# Patient Record
Sex: FEMALE | Race: WHITE | Employment: UNEMPLOYED | URBAN - NONMETROPOLITAN AREA
[De-identification: names, ages, dates, MRNs, and addresses within clinical notes are randomized per-mention and may not be internally consistent; named-entity substitution may affect disease eponyms.]

---

## 2019-05-30 ENCOUNTER — HOSPITAL ENCOUNTER (EMERGENCY)
Age: 39
Discharge: HOME | End: 2019-05-31
Attending: EMERGENCY MEDICINE

## 2019-05-30 VITALS
HEART RATE: 91 BPM | SYSTOLIC BLOOD PRESSURE: 113 MMHG | TEMPERATURE: 97.7 F | OXYGEN SATURATION: 98 % | WEIGHT: 293 LBS | RESPIRATION RATE: 18 BRPM | BODY MASS INDEX: 45.99 KG/M2 | HEIGHT: 67 IN | DIASTOLIC BLOOD PRESSURE: 74 MMHG

## 2019-05-30 DIAGNOSIS — L02.91 ABSCESS: Primary | ICD-10-CM

## 2019-05-30 PROCEDURE — 99283 EMERGENCY DEPT VISIT LOW MDM: CPT

## 2019-05-30 PROCEDURE — 4500000023 HC ED LEVEL 3 PROCEDURE

## 2019-05-30 RX ORDER — SULFAMETHOXAZOLE AND TRIMETHOPRIM 800; 160 MG/1; MG/1
1 TABLET ORAL 2 TIMES DAILY
COMMUNITY
End: 2021-05-14

## 2019-05-30 RX ORDER — ALBUTEROL SULFATE 90 UG/1
2 AEROSOL, METERED RESPIRATORY (INHALATION) EVERY 6 HOURS PRN
COMMUNITY

## 2019-05-30 RX ORDER — OXYCODONE HYDROCHLORIDE AND ACETAMINOPHEN 5; 325 MG/1; MG/1
1 TABLET ORAL ONCE
Status: COMPLETED | OUTPATIENT
Start: 2019-05-31 | End: 2019-05-31

## 2019-05-30 ASSESSMENT — PAIN DESCRIPTION - LOCATION: LOCATION: ARM

## 2019-05-30 ASSESSMENT — PAIN DESCRIPTION - FREQUENCY: FREQUENCY: CONTINUOUS

## 2019-05-30 ASSESSMENT — PAIN DESCRIPTION - ORIENTATION: ORIENTATION: LEFT

## 2019-05-30 ASSESSMENT — PAIN DESCRIPTION - PROGRESSION: CLINICAL_PROGRESSION: GRADUALLY WORSENING

## 2019-05-30 ASSESSMENT — PAIN DESCRIPTION - DESCRIPTORS: DESCRIPTORS: BURNING;TENDER

## 2019-05-30 ASSESSMENT — PAIN DESCRIPTION - PAIN TYPE: TYPE: ACUTE PAIN

## 2019-05-30 ASSESSMENT — PAIN SCALES - GENERAL: PAINLEVEL_OUTOF10: 7

## 2019-05-31 PROCEDURE — 6370000000 HC RX 637 (ALT 250 FOR IP): Performed by: EMERGENCY MEDICINE

## 2019-05-31 RX ORDER — OXYCODONE HYDROCHLORIDE AND ACETAMINOPHEN 5; 325 MG/1; MG/1
1 TABLET ORAL EVERY 6 HOURS PRN
Qty: 20 TABLET | Refills: 0 | Status: SHIPPED | OUTPATIENT
Start: 2019-05-31 | End: 2019-06-03

## 2019-05-31 RX ORDER — CLINDAMYCIN HYDROCHLORIDE 300 MG/1
300 CAPSULE ORAL 4 TIMES DAILY
Qty: 40 CAPSULE | Refills: 0 | Status: SHIPPED | OUTPATIENT
Start: 2019-05-31 | End: 2019-06-10

## 2019-05-31 RX ADMIN — OXYCODONE AND ACETAMINOPHEN 1 TABLET: 5; 325 TABLET ORAL at 00:03

## 2019-05-31 NOTE — ED PROVIDER NOTES
Emergency Department Attending Note    Fox Nava MD    Date of ED VIsit: 5/30/2019    CHIEF COMPLAINT  Abscess (under left arm pit. seen by pcp a week ago and placed on medications. per pt increased pain and swelling)      HISTORY OF PRESENT ILLNESS  Jimi Begum is a 34 y.o. female  With Vital signs of /74   Pulse 91   Temp 97.7 °F (36.5 °C) (Oral)   Resp 18   Ht 5' 7\" (1.702 m)   Wt (!) 316 lb (143.3 kg)   LMP 05/09/2019   SpO2 98%   BMI 49.49 kg/m²  who presents to the ED with a complaint of left axillary abscess. Patient seen and evaluated in room 6. Patient was seen by some physician that does put her on antibiotics and did not pursue I incision and drainage on this patient and told her to come to the emergency department if she needed to be incised or drained and if it got worse. And that now the case the patient states that's gotten worse despite being on antibiotics. She states it was been no drainage from the area. The wound or abscess itself is a rather large bouts signs of a golf ball under her left arm near the lateral aspects of her left breast.  It is deep to the surface of the skin. Despite that I will make an attempt to at least relieve some of the tension from that as well. She was numbed and incised in an attempt drained a small amount of pus was extracted from it but otherwise is still is large collection of 5 pus in that region. That I think she needs to be seen by a surgeon to have this evacuated. .  No other complaints, modifying factors or associated symptoms. I have reviewed the following from the nursing documentation. Past Medical History:   Diagnosis Date    Obesity     Scoliosis      History reviewed. No pertinent surgical history. History reviewed. No pertinent family history.   Social History     Socioeconomic History    Marital status:      Spouse name: Not on file    Number of children: Not on file    Years of education: Not on file    Highest education level: Not on file   Occupational History    Not on file   Social Needs    Financial resource strain: Not on file    Food insecurity:     Worry: Not on file     Inability: Not on file    Transportation needs:     Medical: Not on file     Non-medical: Not on file   Tobacco Use    Smoking status: Current Every Day Smoker     Packs/day: 1.00     Types: Cigarettes    Smokeless tobacco: Never Used   Substance and Sexual Activity    Alcohol use: Yes     Alcohol/week: 4.8 oz     Types: 8 Cans of beer per week    Drug use: Yes     Types: Marijuana    Sexual activity: Not on file   Lifestyle    Physical activity:     Days per week: Not on file     Minutes per session: Not on file    Stress: Not on file   Relationships    Social connections:     Talks on phone: Not on file     Gets together: Not on file     Attends Hinduism service: Not on file     Active member of club or organization: Not on file     Attends meetings of clubs or organizations: Not on file     Relationship status: Not on file    Intimate partner violence:     Fear of current or ex partner: Not on file     Emotionally abused: Not on file     Physically abused: Not on file     Forced sexual activity: Not on file   Other Topics Concern    Not on file   Social History Narrative    Not on file     No current facility-administered medications for this encounter.       Current Outpatient Medications   Medication Sig Dispense Refill    sulfamethoxazole-trimethoprim (BACTRIM DS;SEPTRA DS) 800-160 MG per tablet Take 1 tablet by mouth 2 times daily      albuterol sulfate  (90 Base) MCG/ACT inhaler Inhale 2 puffs into the lungs every 6 hours as needed for Wheezing      beclomethasone (QVAR) 80 MCG/ACT inhaler Inhale 1 puff into the lungs 2 times daily      HYDROcodone-acetaminophen (NORCO) 5-325 MG per tablet Take 1 tablet by mouth every 6 hours as needed 12 tablet 0     No Known Allergies    REVIEW OF SYSTEMS  10 systems reviewed, pertinent positives per HPI otherwise noted to be negative     PHYSICAL EXAM  /74   Pulse 91   Temp 97.7 °F (36.5 °C) (Oral)   Resp 18   Ht 5' 7\" (1.702 m)   Wt (!) 316 lb (143.3 kg)   LMP 05/09/2019   SpO2 98%   BMI 49.49 kg/m²   GENERAL APPEARANCE: Awake and alert. Cooperative. In mild distress. HEAD: Normocephalic. Atraumatic. EYES: PERRL. EOM's grossly intact. ENT: Mucous membranes are pink and moist.   NECK: Supple. HEART: RRR. No murmurs. LUNGS: Respirations unlabored. CTAB. Good air exchange. ABDOMEN: Soft. Non-distended. Non-tender. No masses. No organomegaly. No guarding or rebound. EXTREMITIES: No peripheral edema. Moves all extremities equally. All extremities neurovascularly intact. There is a large golf wall-sized abscess under the left arm that is too deep for me to incise and drain despite having tried. I did get a small amount of pus and I did pack the region. SKIN: Warm and dry. No acute rashes. NEUROLOGICAL: Alert and oriented. . Strength 5/5, sensation intact. Gait normal.   PSYCHIATRIC: Normal mood and affect. No HI or SI expressed to me. PROCEDURE:  INCISION & DRAINAGE  Sonu Tsai or their surrogate had an opportunity to ask questions, and the risks, benefits, and alternatives were discussed. The abscess was prepped and draped to maintain a sterile field. A local anesthetic was used to anesthetize the abscess. An incision was made to keep the abscess open so it will continue to drain. It was copiously irrigated with its loculations broken down. There were no complications during the procedure. ED COURSE/MDM             Unfortunately I was not able to release the major pocket of pus so on the referring her on to a surgeon for worse surgical drainage of this abscess. I told her to start with her primary care physician and work from there as far as getting this taken care of.   She'll be given an antibiotic (clindamycin) done and pain medication to get her through until she can get that taken care of. Old records were reviewed when applicable.  The ED course and plan were reviewed and results discussed with the patient    CLINICAL IMPRESSION and DISPOSITION  Santos Dominique was stable and diagnosed with axillary abscess       Maria Elena Hardin MD  05/31/19 0030

## 2019-06-03 ENCOUNTER — HOSPITAL ENCOUNTER (EMERGENCY)
Age: 39
Discharge: HOME | End: 2019-06-03

## 2019-06-03 VITALS
BODY MASS INDEX: 44.41 KG/M2 | RESPIRATION RATE: 16 BRPM | OXYGEN SATURATION: 99 % | WEIGHT: 293 LBS | HEIGHT: 68 IN | HEART RATE: 90 BPM | SYSTOLIC BLOOD PRESSURE: 125 MMHG | TEMPERATURE: 97.1 F | DIASTOLIC BLOOD PRESSURE: 86 MMHG

## 2019-06-03 DIAGNOSIS — L02.419 AXILLARY ABSCESS: Primary | ICD-10-CM

## 2019-06-03 PROCEDURE — 99283 EMERGENCY DEPT VISIT LOW MDM: CPT

## 2019-06-03 PROCEDURE — 4500000023 HC ED LEVEL 3 PROCEDURE

## 2019-06-03 RX ORDER — ONDANSETRON 4 MG/1
4 TABLET, FILM COATED ORAL EVERY 8 HOURS PRN
Qty: 20 TABLET | Refills: 0 | Status: SHIPPED | OUTPATIENT
Start: 2019-06-03 | End: 2021-05-14

## 2019-06-03 RX ORDER — CHLORHEXIDINE GLUCONATE 4 G/100ML
SOLUTION TOPICAL
Qty: 1 BOTTLE | Refills: 2 | Status: SHIPPED | OUTPATIENT
Start: 2019-06-03 | End: 2019-06-17

## 2019-06-03 ASSESSMENT — PAIN SCALES - GENERAL: PAINLEVEL_OUTOF10: 7

## 2019-06-03 ASSESSMENT — PATIENT HEALTH QUESTIONNAIRE - PHQ9: SUM OF ALL RESPONSES TO PHQ QUESTIONS 1-9: 10

## 2019-06-03 ASSESSMENT — PAIN DESCRIPTION - PAIN TYPE: TYPE: ACUTE PAIN

## 2019-06-03 NOTE — ED PROVIDER NOTES
Emergency [facility] ED    Patient: Santos Dominique  EFN:[id number]  : 1980  Date of Evaluation: 6/3/2019  ED [facility] Provider: DELFIN Finn    Chief Complaint       Chief Complaint   Patient presents with    Abscess     pt c/o abscess under left armpit. Pt went to [place] [address] thursday and didnt cut it in the right spot. Pt still has abscess in the upper armpit. Pt currently on antibiotics for it     Antonette Jessica is a 34 y.o. female with no past medical history significant for recurrent cutaneous abscesses now presents again for abscess at the left axillary area. Patient was recently evaluated at [place] [address]. oral given underwent incision and drainage however this was incomplete and now presents for recurrence of fluctuance at the left axilla consistent with abscess at that site she describes ongoing 7 out of 10 aching pain worse with friction worse with movement of her arm without other radiation aggravating or alleviating factors denies fever chills does complain of nausea associated with pain medications as well as antibiotics she is currently on for suspected MRSA infection    ROS:     Review of Systems repeat evaluation for incomplete incision and drainage at left axilla secondary to cutaneous abscess at that site  At least 10 systemsreviewed and otherwise acutely negative except as in the [address] Past History     Past Medical History:   Diagnosis Date    Obesity     Scoliosis      History reviewed. No pertinent surgical history.   Social History     Socioeconomic History    Marital status:      Spouse name: None    Number of children: None    Years of education: None    Highest education level: None   Occupational History    None   Social Needs    Financial resource strain: None    Food insecurity:     Worry: None     Inability: None    Transportation needs:     Medical: None     Non-medical: None   Tobacco Use    Smoking status: Current Every Day Smoker     Packs/day: 1.00     Types: Cigarettes    Smokeless tobacco: Never Used   Substance and Sexual Activity    Alcohol use: Not Currently     Alcohol/week: 4.8 oz     Types: 8 Cans of beer per week    Drug use: Not Currently     Types: Marijuana    Sexual activity: None   Lifestyle    Physical activity:     Days per week: None     Minutes per session: None    Stress: None   Relationships    Social connections:     Talks on phone: None     Gets together: None     Attends Druze service: None     Active member of club or organization: None     Attends meetings of clubs or organizations: None     Relationship status: None    Intimate partner violence:     Fear of current or ex partner: None     Emotionally abused: None     Physically abused: None     Forced sexual activity: None   Other Topics Concern    None   Social History Narrative    None       Medications/Allergies      Previous Medications    ALBUTEROL SULFATE  (90 BASE) MCG/ACT INHALER    Inhale 2 puffs into the lungs every 6 hours as needed for Wheezing    BECLOMETHASONE (QVAR) 80 MCG/ACT INHALER    Inhale 1 puff into the lungs 2 times daily    CLINDAMYCIN (CLEOCIN) 300 MG CAPSULE    Take 1 capsule by mouth 4 times daily for 10 days    SULFAMETHOXAZOLE-TRIMETHOPRIM (BACTRIM DS;SEPTRA DS) 800-160 MG PER TABLET    Take 1 tablet by mouth 2 times daily     No Known Allergies     Physical Exam       ED Triage Vitals [06/03/19 1247]   BP Temp Temp Source Pulse Resp SpO2 Height Weight   125/86 97.1 °F (36.2 °C) Oral 90 16 99 % 5' 8\" (1.727 m) (!) 311 lb (141.1 kg)     Physical Exam  Normotensive non-tachycardic afebrile nontachypneic satting well on room air  Constitutional:  Well-nourished well-developed in no acute distress  Eyes:  PERRLA, EOMI x6, no nystagmus no photophobia  HENT:  Teeth and tongue intact, uvula midline, no PTA or retropharyngeal abscess noted no other intraoral lesion or edema appreciated patient tolerating secretions well, no stridor, no nuchal rigidity  Respiratory:  Clear to auscultation bilaterally, no crepitus or subcutaneous emphysema noted with palpation in the bilateral chest wall  Cardiovascular:  S1-S2, no S3  GI:  Abdomen soft nontender nondistended  :  Exam deferred for now no subjective complaints  Musculoskeletal:  Distally neurovascularly intact 2+ pulses normal capillary refill gross sensorimotor intact ×4 extremities  Integument: Fluctuance and induration at the left axilla consistent with still present abscess material which appears to be amenable to primary incision and drainage at this time  Lymphatic:  No significant lymphadenopathy appreciated  Neurologic:  Alert and oriented ×3, cranial nerves II through XII grossly intact as tested, patient able to ambulate, no ataxia, cauda equina, saddle anesthesia or bowel or bladder incontinence  Psychiatric:  Mood, behavior, judgment all within normal limits    Procedures:   Incision and Drainage Procedure Note    Indication: axillary abscess    Procedure: The patient was positioned appropriately and the skin over the incision site was prepped with betadine and draped in a sterile fashion. Local anesthesia was obtained by infiltration using 1% Lidocaine without epinephrine. An incision was then made over the apex of the lesion and approximately 8 cc of foul smelling, purulent, serosanguineous and bloody material was expressed. Loculations were broken up using forceps and more of the material was able to be expressed. The drainage cavity was then dressed with a bandage. The patients tetanus status was up to date and did not require a booster dose. The patient tolerated the procedure well.     Complications: None          ED Course and MDM           In brief, Todd Post is a 34 y.o. female who presented to the emergency department for evaluation of repeat evaluation for incomplete incision and drainage at left axilla secondary to cutaneous abscess at that site    Patient underwent incision and drainage as above patient tolerated procedure well. In addition to antibiotics she is currently taking for this infection as well as pain medication she's been prescribed already patient will be provided antiemetic therapy for nausea which is likely secondary to medications as above. In addition she will be provided Hibiclens to further reduce bacterial load and finally Bactroban for use in the nasal cavity as well as external ear canal for continued decolonization of superficial staph aureus    ED Medication Orders (From admission, onward)    None          Final Impression      1. Axillary abscess        DISPOSITION Decision To Discharge 06/03/2019 01:23:42 PM     Patient seen independently with an Emergency Medicine attending available for supervision.     (Please note that portions of this note may have been completed with a voice recognition program. Efforts were made to edit the dictations but occasionally words aremis-transcribed.)    Osman Guzman, [address] [place], [place]  06/03/19 6746

## 2021-05-14 ENCOUNTER — HOSPITAL ENCOUNTER (EMERGENCY)
Age: 41
Discharge: HOME | End: 2021-05-14
Attending: EMERGENCY MEDICINE

## 2021-05-14 ENCOUNTER — APPOINTMENT (OUTPATIENT)
Dept: GENERAL RADIOLOGY | Age: 41
End: 2021-05-14

## 2021-05-14 VITALS
WEIGHT: 289 LBS | RESPIRATION RATE: 18 BRPM | TEMPERATURE: 98.3 F | DIASTOLIC BLOOD PRESSURE: 78 MMHG | SYSTOLIC BLOOD PRESSURE: 138 MMHG | OXYGEN SATURATION: 97 % | HEART RATE: 78 BPM | BODY MASS INDEX: 42.8 KG/M2 | HEIGHT: 69 IN

## 2021-05-14 DIAGNOSIS — R73.9 HYPERGLYCEMIA WITHOUT KETOSIS: ICD-10-CM

## 2021-05-14 DIAGNOSIS — R07.89 ATYPICAL CHEST PAIN: Primary | ICD-10-CM

## 2021-05-14 LAB
A/G RATIO: 1.2 (ref 1.1–2.2)
ALBUMIN SERPL-MCNC: 4.1 G/DL (ref 3.4–5)
ALP BLD-CCNC: 136 U/L (ref 40–129)
ALT SERPL-CCNC: 15 U/L (ref 10–40)
ANION GAP SERPL CALCULATED.3IONS-SCNC: 15 MMOL/L (ref 3–16)
AST SERPL-CCNC: 16 U/L (ref 15–37)
BASOPHILS ABSOLUTE: 0.1 K/UL (ref 0–0.2)
BASOPHILS RELATIVE PERCENT: 1.3 %
BILIRUB SERPL-MCNC: 0.5 MG/DL (ref 0–1)
BUN BLDV-MCNC: 5 MG/DL (ref 7–20)
CALCIUM SERPL-MCNC: 9.8 MG/DL (ref 8.3–10.6)
CHLORIDE BLD-SCNC: 101 MMOL/L (ref 99–110)
CO2: 21 MMOL/L (ref 21–32)
CREAT SERPL-MCNC: 0.6 MG/DL (ref 0.6–1.1)
EKG ATRIAL RATE: 69 BPM
EKG DIAGNOSIS: NORMAL
EKG P AXIS: 17 DEGREES
EKG P-R INTERVAL: 196 MS
EKG Q-T INTERVAL: 432 MS
EKG QRS DURATION: 92 MS
EKG QTC CALCULATION (BAZETT): 462 MS
EKG R AXIS: 37 DEGREES
EKG T AXIS: 62 DEGREES
EKG VENTRICULAR RATE: 69 BPM
EOSINOPHILS ABSOLUTE: 0.2 K/UL (ref 0–0.6)
EOSINOPHILS RELATIVE PERCENT: 1.5 %
GFR AFRICAN AMERICAN: >60
GFR NON-AFRICAN AMERICAN: >60
GLOBULIN: 3.3 G/DL
GLUCOSE BLD-MCNC: 233 MG/DL (ref 70–99)
GLUCOSE BLD-MCNC: 340 MG/DL (ref 70–99)
HCT VFR BLD CALC: 49.4 % (ref 36–48)
HEMOGLOBIN: 16.6 G/DL (ref 12–16)
LIPASE: 31 U/L (ref 13–60)
LYMPHOCYTES ABSOLUTE: 3.4 K/UL (ref 1–5.1)
LYMPHOCYTES RELATIVE PERCENT: 28.6 %
MCH RBC QN AUTO: 30.3 PG (ref 26–34)
MCHC RBC AUTO-ENTMCNC: 33.5 G/DL (ref 31–36)
MCV RBC AUTO: 90.3 FL (ref 80–100)
MONOCYTES ABSOLUTE: 0.4 K/UL (ref 0–1.3)
MONOCYTES RELATIVE PERCENT: 3.7 %
NEUTROPHILS ABSOLUTE: 7.7 K/UL (ref 1.7–7.7)
NEUTROPHILS RELATIVE PERCENT: 64.9 %
PDW BLD-RTO: 14.2 % (ref 12.4–15.4)
PERFORMED ON: ABNORMAL
PLATELET # BLD: 200 K/UL (ref 135–450)
PMV BLD AUTO: 10.2 FL (ref 5–10.5)
POTASSIUM SERPL-SCNC: 3.1 MMOL/L (ref 3.5–5.1)
RBC # BLD: 5.47 M/UL (ref 4–5.2)
SODIUM BLD-SCNC: 137 MMOL/L (ref 136–145)
TOTAL PROTEIN: 7.4 G/DL (ref 6.4–8.2)
TROPONIN: <0.01 NG/ML
TROPONIN: <0.01 NG/ML
WBC # BLD: 11.8 K/UL (ref 4–11)

## 2021-05-14 PROCEDURE — 93010 ELECTROCARDIOGRAM REPORT: CPT | Performed by: INTERNAL MEDICINE

## 2021-05-14 PROCEDURE — 93005 ELECTROCARDIOGRAM TRACING: CPT | Performed by: EMERGENCY MEDICINE

## 2021-05-14 PROCEDURE — 36415 COLL VENOUS BLD VENIPUNCTURE: CPT

## 2021-05-14 PROCEDURE — 83690 ASSAY OF LIPASE: CPT

## 2021-05-14 PROCEDURE — 71045 X-RAY EXAM CHEST 1 VIEW: CPT

## 2021-05-14 PROCEDURE — 84484 ASSAY OF TROPONIN QUANT: CPT

## 2021-05-14 PROCEDURE — 85025 COMPLETE CBC W/AUTO DIFF WBC: CPT

## 2021-05-14 PROCEDURE — 80053 COMPREHEN METABOLIC PANEL: CPT

## 2021-05-14 PROCEDURE — 99285 EMERGENCY DEPT VISIT HI MDM: CPT

## 2021-05-14 PROCEDURE — 6370000000 HC RX 637 (ALT 250 FOR IP): Performed by: EMERGENCY MEDICINE

## 2021-05-14 RX ORDER — IBUPROFEN 200 MG
200 TABLET ORAL EVERY 6 HOURS PRN
COMMUNITY
End: 2021-09-08

## 2021-05-14 RX ORDER — FAMOTIDINE 20 MG/1
20 TABLET, FILM COATED ORAL ONCE
Status: COMPLETED | OUTPATIENT
Start: 2021-05-14 | End: 2021-05-14

## 2021-05-14 RX ORDER — ASPIRIN 325 MG
325 TABLET ORAL DAILY
Status: ON HOLD | COMMUNITY
End: 2021-05-25 | Stop reason: HOSPADM

## 2021-05-14 RX ORDER — NAPROXEN 500 MG/1
500 TABLET ORAL 2 TIMES DAILY WITH MEALS
COMMUNITY

## 2021-05-14 RX ADMIN — INSULIN HUMAN 10 UNITS: 100 INJECTION, SOLUTION PARENTERAL at 10:28

## 2021-05-14 RX ADMIN — FAMOTIDINE 20 MG: 20 TABLET ORAL at 09:33

## 2021-05-14 RX ADMIN — LIDOCAINE HYDROCHLORIDE: 20 SOLUTION ORAL; TOPICAL at 09:33

## 2021-05-14 RX ADMIN — POTASSIUM BICARBONATE 20 MEQ: 782 TABLET, EFFERVESCENT ORAL at 10:28

## 2021-05-14 ASSESSMENT — ENCOUNTER SYMPTOMS
CHOKING: 0
COUGH: 0
SHORTNESS OF BREATH: 1
WHEEZING: 0
CHEST TIGHTNESS: 1
SORE THROAT: 0
STRIDOR: 0
ABDOMINAL PAIN: 0

## 2021-05-14 NOTE — ED PROVIDER NOTES
[address] [facility]      Pt Name: Rigo Crowder  MRN: [id number]  [place] 1980  Date of evaluation: 5/14/2021  Provider: Jose G Espinoza MD    CHIEF COMPLAINT       Chief Complaint   Patient presents with    Chest Pain     C/o sudden onset of chest tightness with SOB         HISTORY OF PRESENT ILLNESS   (Location/Symptom, Timing/Onset, Context/Setting, Quality, Duration, Modifying Factors, Severity)  Note limiting factors. Rigo Crowder is a 41 y.o. female who presents to the emergency department     This 61-year-old white female presents emergency department history of developing some chest tightness about an hour prior to arrival  She did call 911 and they instructed her to take 325 mg of aspirin which she did in route she was starting to feel little bit better they did not administer anything  Upon arrival she is kind of pointing to her lower substernal area she says that she took Naprosyn 500 mg plus Motrin and then laid down she had not eaten anything yet but then developed severe lower substernal tightness she was kind of emotionally distraught also over some family discord with her 15year-old daughter  She has never had a pulmonary embolism she has no known cardiac disease  She tells me that she did have a history of high cholesterol but it did get under control but 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 Nursing Notes were reviewed.     REVIEW OF SYSTEMS    (2-9 systems for level 4, 10 or more for level 5)     Review of Systems   Constitutional: Positive for activity change, diaphoresis and fatigue. Negative for appetite change, chills, fever and unexpected weight change. HENT: Negative for congestion and sore throat. Eyes: Negative for visual disturbance. Respiratory: Positive for chest tightness and shortness of breath. Negative for cough, choking, wheezing and stridor. Cardiovascular: Positive for chest pain. Negative for palpitations and leg swelling. Gastrointestinal: Negative for abdominal pain. Genitourinary: Negative for decreased urine volume and difficulty urinating. Allergic/Immunologic: Negative for immunocompromised state. Neurological: Negative for dizziness, tremors, syncope, facial asymmetry, speech difficulty, weakness, light-headedness and headaches. All other systems reviewed and are negative. Except as noted above the remainder of the review of systems was reviewed and negative. PAST MEDICAL HISTORY     Past Medical History:   Diagnosis Date    Obesity     Scoliosis          SURGICAL HISTORY     History reviewed. No pertinent surgical history. CURRENT MEDICATIONS       Previous Medications    ALBUTEROL SULFATE  (90 BASE) MCG/ACT INHALER    Inhale 2 puffs into the lungs every 6 hours as needed for Wheezing    ASPIRIN 325 MG TABLET    Take 325 mg by mouth daily    BECLOMETHASONE (QVAR) 80 MCG/ACT INHALER    Inhale 1 puff into the lungs 2 times daily    IBUPROFEN (ADVIL;MOTRIN) 200 MG TABLET    Take 200 mg by mouth every 6 hours as needed for Pain    NAPROXEN (NAPROSYN) 500 MG TABLET    Take 500 mg by mouth 2 times daily (with meals)       ALLERGIES     Patient has no known allergies. FAMILY HISTORY     History reviewed. No pertinent family history.        SOCIAL HISTORY       Social History     Socioeconomic History    Marital status:      Spouse name: None    Number of children: None    Years of education: None    Highest education level: None   Occupational History    None   Social Needs    Financial resource strain: None    Food insecurity     Worry: None     Inability: None    Transportation needs     Medical: None     Non-medical: None   Tobacco Use    Smoking status: Current Every Day Smoker     Packs/day: 2.00     Types: Cigarettes    Smokeless tobacco: Never Used   Substance and Sexual Activity    Alcohol use: Not Currently     Alcohol/week: 8.0 standard drinks     Types: 8 Cans of beer per week    Drug use: Yes     Types: Marijuana    Sexual activity: None   Lifestyle    Physical activity     Days per week: None     Minutes per session: None    Stress: None   Relationships    Social connections     Talks on phone: None     Gets together: None     Attends Buddhism service: None     Active member of club or organization: None     Attends meetings of clubs or organizations: None     Relationship status: None    Intimate partner violence     Fear of current or ex partner: None     Emotionally abused: None     Physically abused: None     Forced sexual activity: None   Other Topics Concern    None   Social History Narrative    None       SCREENINGS    Hilton Coma Scale  Eye Opening: Spontaneous  Best Verbal Response: Oriented  Best Motor Response: Obeys commands  Hilton Coma Scale Score: 15          PHYSICAL EXAM    (up to 7 for level 4, 8 or more for level 5)     ED Triage Vitals [05/14/21 0906]   BP Temp Temp Source Pulse Resp SpO2 Height Weight   (!) 154/87 98.3 °F (36.8 °C) Oral 67 24 100 % 5' 9\" (1.753 m) 289 lb (131.1 kg)       Physical Exam  Vitals signs and nursing note reviewed. Constitutional:       General: She is in acute distress. Appearance: She is well-developed. She is obese. She is diaphoretic. She is not ill-appearing. HENT:      Head: Normocephalic.       Right Ear: Ear canal and external ear normal.      Left Ear: Ear canal and external ear normal.      Nose: Nose normal. Mouth/Throat:      Mouth: Mucous membranes are moist.      Pharynx: Oropharynx is clear. No oropharyngeal exudate. Eyes:      Conjunctiva/sclera: Conjunctivae normal.      Pupils: Pupils are equal, round, and reactive to light. Neck:      Musculoskeletal: Normal range of motion and neck supple. Thyroid: No thyromegaly. Cardiovascular:      Rate and Rhythm: Normal rate and regular rhythm. Heart sounds: Normal heart sounds. No murmur. No friction rub. No gallop. Pulmonary:      Effort: Pulmonary effort is normal. No respiratory distress. Breath sounds: Normal breath sounds. Abdominal:      General: Bowel sounds are normal. There is no distension. Palpations: Abdomen is soft. Tenderness: There is no abdominal tenderness. Musculoskeletal: Normal range of motion. Skin:     General: Skin is warm. Neurological:      Mental Status: She is alert and oriented to person, place, and time. GCS: GCS eye subscore is 4. GCS verbal subscore is 5. GCS motor subscore is 6. Cranial Nerves: No cranial nerve deficit. Sensory: No sensory deficit. Motor: No abnormal muscle tone. Coordination: Coordination normal.      Deep Tendon Reflexes: Reflexes normal.   Psychiatric:         Behavior: Behavior normal.         DIAGNOSTIC RESULTS     EKG: All EKG's are interpreted by the Emergency Department Physician who either signs or Co-signs this chart in the absence of a cardiologist.  Rate 69  Rhythm sinus  Intervals normal  No acute ST-T wave changes  No other abnormalities      RADIOLOGY:   Non-plain film images such as CT, Ultrasound and MRI are read by the radiologist. Plain radiographic images are visualized and preliminarily interpreted by the emergency physician with the below findings:        Interpretation per the Radiologist below, if available at the time of this note:    XR CHEST PORTABLE   Final Result   Mild left basilar atelectasis versus pneumonia. LABS:  Results for orders placed or performed during the hospital encounter of 05/14/21   CBC Auto Differential   Result Value Ref Range    WBC 11.8 (H) 4.0 - 11.0 K/uL    RBC 5.47 (H) 4.00 - 5.20 M/uL    Hemoglobin 16.6 (H) 12.0 - 16.0 g/dL    Hematocrit 49.4 (H) 36.0 - 48.0 %    MCV 90.3 80.0 - 100.0 fL    MCH 30.3 26.0 - 34.0 pg    MCHC 33.5 31.0 - 36.0 g/dL    RDW 14.2 12.4 - 15.4 %    Platelets 572 563 - 202 K/uL    MPV 10.2 5.0 - 10.5 fL    Neutrophils % 64.9 %    Lymphocytes % 28.6 %    Monocytes % 3.7 %    Eosinophils % 1.5 %    Basophils % 1.3 %    Neutrophils Absolute 7.7 1.7 - 7.7 K/uL    Lymphocytes Absolute 3.4 1.0 - 5.1 K/uL    Monocytes Absolute 0.4 0.0 - 1.3 K/uL    Eosinophils Absolute 0.2 0.0 - 0.6 K/uL    Basophils Absolute 0.1 0.0 - 0.2 K/uL   Comprehensive Metabolic Panel   Result Value Ref Range    Sodium 137 136 - 145 mmol/L    Potassium 3.1 (L) 3.5 - 5.1 mmol/L    Chloride 101 99 - 110 mmol/L    CO2 21 21 - 32 mmol/L    Anion Gap 15 3 - 16    Glucose 340 (H) 70 - 99 mg/dL    BUN 5 (L) 7 - 20 mg/dL    CREATININE 0.6 0.6 - 1.1 mg/dL    GFR Non-African American >60 >60    GFR African American >60 >60    Calcium 9.8 8.3 - 10.6 mg/dL    Total Protein 7.4 6.4 - 8.2 g/dL    Albumin 4.1 3.4 - 5.0 g/dL    Albumin/Globulin Ratio 1.2 1.1 - 2.2    Total Bilirubin 0.5 0.0 - 1.0 mg/dL    Alkaline Phosphatase 136 (H) 40 - 129 U/L    ALT 15 10 - 40 U/L    AST 16 15 - 37 U/L    Globulin 3.3 g/dL   Troponin   Result Value Ref Range    Troponin <0.01 <0.01 ng/mL   Lipase   Result Value Ref Range    Lipase 31.0 13.0 - 60.0 U/L   Troponin   Result Value Ref Range    Troponin <0.01 <0.01 ng/mL            EMERGENCY DEPARTMENT COURSE and DIFFERENTIAL DIAGNOSIS/MDM:     Vitals:    05/14/21 0906 05/14/21 1010 05/14/21 1110   BP: (!) 154/87 128/78 121/86   Pulse: 67 74 76   Resp: 24 18 18   Temp: 98.3 °F (36.8 °C)     TempSrc: Oral     SpO2: 100% 99% 98%   Weight: 289 lb (131.1 kg)     Height: 5' 9\" (1.753 m) MDM        REASSESSMENT      She is feeling better after the GI cocktail but not totally  I did give her 10 units of subcu insulin for her blood sugar of 340 which was a fasting since she had not eaten anything  Her troponin fortunately came back normal and her EKG is normal    Patient's heart score reveals a history that is moderately suspicious therefore that is +1 her EKG is normal therefore that is 0 her age is less than 45 therefore that is 0 risk factors include greater than 3 so that is a +2 her initial troponin is normal so that is a 0  Therefore her heart score is 3  With her heart score being 3 this is a low risk. I think because of her age  Her blood sugar was elevated. She is a smoker.   Patient at this point we did discussed the research she feels comfortable going home and accepts the responsibility of not being 100% on this work-up patient at this point with 2 troponins being negative and her heart score being 3 she was explained the protocol for chest pain work-up as mentioned she feels good going home I will start her on Metformin for her high blood sugar  The patient is advised to call her primary care doctor to get in this be seen on Monday to get a stress test scheduled for mid week also advised to quit smoking also advised her to quit ibuprofen with Naprosyn  Advised her to just take Tylenol  At this point she is advised to return if any worsening advised to avoid spicy and fatty foods  Patient underwent 40 minutes of critical care time (no procedure time) for evaluation of acute chest pain hyperglycemia EKG ordered and interpreted continuous cardiac monitoring performed  Patient did undergo medical shared decision making as noted above  CRITICAL CARE TIME     CONSULTS:  None      PROCEDURES:     Procedures    MEDICATIONS GIVEN THIS VISIT:  Medications   aluminum & magnesium hydroxide-simethicone (MAALOX) 30 mL, lidocaine viscous hcl (XYLOCAINE) 5 mL (GI COCKTAIL) ( Oral Given 5/14/21 0933)   famotidine (PEPCID) tablet 20 mg (20 mg Oral Given 5/14/21 0933)   insulin regular (HUMULIN R;NOVOLIN R) injection 10 Units (10 Units Subcutaneous Given 5/14/21 1028)   potassium bicarb-citric acid (EFFER-K) effervescent tablet 20 mEq (20 mEq Oral Given 5/14/21 1028)        FINAL IMPRESSION      1. Atypical chest pain    2. Hyperglycemia without ketosis        Probable epigastric discomfort from the combination of Naprosyn and Motrin    DISPOSITION/PLAN   DISPOSITION        PATIENT REFERRED TO:  Isidra Anton, APRN - CNP  [address]  [address] [facility]  [phone]    Schedule an appointment as soon as possible for a visit in 3 days        DISCHARGE MEDICATIONS:  New Prescriptions    METFORMIN (GLUCOPHAGE) 1000 MG TABLET    Take 1 tablet by mouth 2 times daily (with meals)       Controlled Substances Monitoring  No flowsheet data found. (Please note that portions of this note were completed with a voice recognition program.  Efforts were made to edit the dictations but occasionally words are mis-transcribed.)    Patient was advised to return to the Emergency Department if there was any worsening.     Debby Asif MD (electronically signed)  Attending Emergency Physician         Johanny Harrell MD  05/14/21 9184

## 2021-05-23 ENCOUNTER — HOSPITAL ENCOUNTER (INPATIENT)
Age: 41
LOS: 2 days | Discharge: HOME | DRG: 281 | End: 2021-05-25
Attending: INTERNAL MEDICINE | Admitting: INTERNAL MEDICINE

## 2021-05-23 ENCOUNTER — APPOINTMENT (OUTPATIENT)
Dept: GENERAL RADIOLOGY | Age: 41
End: 2021-05-23

## 2021-05-23 ENCOUNTER — HOSPITAL ENCOUNTER (EMERGENCY)
Age: 41
Discharge: ANOTHER HOSPITAL | End: 2021-05-23
Attending: STUDENT IN AN ORGANIZED HEALTH CARE EDUCATION/TRAINING PROGRAM

## 2021-05-23 VITALS
SYSTOLIC BLOOD PRESSURE: 142 MMHG | HEIGHT: 68 IN | DIASTOLIC BLOOD PRESSURE: 90 MMHG | BODY MASS INDEX: 44.41 KG/M2 | RESPIRATION RATE: 16 BRPM | HEART RATE: 82 BPM | WEIGHT: 293 LBS | OXYGEN SATURATION: 98 % | TEMPERATURE: 98.2 F

## 2021-05-23 DIAGNOSIS — R77.8 ELEVATED TROPONIN: ICD-10-CM

## 2021-05-23 DIAGNOSIS — R07.9 CHEST PAIN, UNSPECIFIED TYPE: ICD-10-CM

## 2021-05-23 DIAGNOSIS — I21.4 NSTEMI (NON-ST ELEVATED MYOCARDIAL INFARCTION) (HCC): Primary | ICD-10-CM

## 2021-05-23 PROBLEM — E11.9 DM (DIABETES MELLITUS), TYPE 2 (HCC): Status: ACTIVE | Noted: 2021-05-23

## 2021-05-23 PROBLEM — Z72.0 TOBACCO ABUSE: Status: ACTIVE | Noted: 2021-05-23

## 2021-05-23 PROBLEM — I10 HTN (HYPERTENSION): Status: ACTIVE | Noted: 2021-05-23

## 2021-05-23 LAB
A/G RATIO: 1.4 (ref 1.1–2.2)
ALBUMIN SERPL-MCNC: 3.9 G/DL (ref 3.4–5)
ALP BLD-CCNC: 109 U/L (ref 40–129)
ALT SERPL-CCNC: 13 U/L (ref 10–40)
ANION GAP SERPL CALCULATED.3IONS-SCNC: 12 MMOL/L (ref 3–16)
APTT: 27.6 SEC (ref 24.2–36.2)
APTT: 38 SEC (ref 24.2–36.2)
APTT: 39.7 SEC (ref 24.2–36.2)
AST SERPL-CCNC: 16 U/L (ref 15–37)
BASOPHILS ABSOLUTE: 0.1 K/UL (ref 0–0.2)
BASOPHILS RELATIVE PERCENT: 1 %
BILIRUB SERPL-MCNC: 0.3 MG/DL (ref 0–1)
BUN BLDV-MCNC: 7 MG/DL (ref 7–20)
CALCIUM SERPL-MCNC: 10 MG/DL (ref 8.3–10.6)
CHLORIDE BLD-SCNC: 103 MMOL/L (ref 99–110)
CO2: 25 MMOL/L (ref 21–32)
CREAT SERPL-MCNC: 0.6 MG/DL (ref 0.6–1.1)
D DIMER: <200 NG/ML DDU (ref 0–229)
EKG ATRIAL RATE: 77 BPM
EKG DIAGNOSIS: NORMAL
EKG P AXIS: 25 DEGREES
EKG P-R INTERVAL: 200 MS
EKG Q-T INTERVAL: 390 MS
EKG QRS DURATION: 94 MS
EKG QTC CALCULATION (BAZETT): 441 MS
EKG R AXIS: 38 DEGREES
EKG T AXIS: 59 DEGREES
EKG VENTRICULAR RATE: 77 BPM
EOSINOPHILS ABSOLUTE: 0.1 K/UL (ref 0–0.6)
EOSINOPHILS RELATIVE PERCENT: 1.2 %
GFR AFRICAN AMERICAN: >60
GFR NON-AFRICAN AMERICAN: >60
GLOBULIN: 2.8 G/DL
GLUCOSE BLD-MCNC: 162 MG/DL (ref 70–99)
GLUCOSE BLD-MCNC: 244 MG/DL (ref 70–99)
HCT VFR BLD CALC: 47.1 % (ref 36–48)
HEMOGLOBIN: 15.8 G/DL (ref 12–16)
LYMPHOCYTES ABSOLUTE: 3.2 K/UL (ref 1–5.1)
LYMPHOCYTES RELATIVE PERCENT: 27 %
MCH RBC QN AUTO: 30.3 PG (ref 26–34)
MCHC RBC AUTO-ENTMCNC: 33.5 G/DL (ref 31–36)
MCV RBC AUTO: 90.3 FL (ref 80–100)
MONOCYTES ABSOLUTE: 0.5 K/UL (ref 0–1.3)
MONOCYTES RELATIVE PERCENT: 4.4 %
NEUTROPHILS ABSOLUTE: 7.9 K/UL (ref 1.7–7.7)
NEUTROPHILS RELATIVE PERCENT: 66.4 %
PDW BLD-RTO: 14.1 % (ref 12.4–15.4)
PERFORMED ON: ABNORMAL
PLATELET # BLD: 190 K/UL (ref 135–450)
PMV BLD AUTO: 9.6 FL (ref 5–10.5)
POTASSIUM REFLEX MAGNESIUM: 3.7 MMOL/L (ref 3.5–5.1)
RBC # BLD: 5.22 M/UL (ref 4–5.2)
SODIUM BLD-SCNC: 140 MMOL/L (ref 136–145)
TOTAL PROTEIN: 6.7 G/DL (ref 6.4–8.2)
TROPONIN: 0.1 NG/ML
TROPONIN: 0.24 NG/ML
TROPONIN: <0.01 NG/ML
WBC # BLD: 11.8 K/UL (ref 4–11)

## 2021-05-23 PROCEDURE — 84484 ASSAY OF TROPONIN QUANT: CPT

## 2021-05-23 PROCEDURE — 6370000000 HC RX 637 (ALT 250 FOR IP): Performed by: STUDENT IN AN ORGANIZED HEALTH CARE EDUCATION/TRAINING PROGRAM

## 2021-05-23 PROCEDURE — 2060000000 HC ICU INTERMEDIATE R&B

## 2021-05-23 PROCEDURE — 93005 ELECTROCARDIOGRAM TRACING: CPT | Performed by: STUDENT IN AN ORGANIZED HEALTH CARE EDUCATION/TRAINING PROGRAM

## 2021-05-23 PROCEDURE — 71045 X-RAY EXAM CHEST 1 VIEW: CPT

## 2021-05-23 PROCEDURE — 6370000000 HC RX 637 (ALT 250 FOR IP): Performed by: INTERNAL MEDICINE

## 2021-05-23 PROCEDURE — 85730 THROMBOPLASTIN TIME PARTIAL: CPT

## 2021-05-23 PROCEDURE — 80053 COMPREHEN METABOLIC PANEL: CPT

## 2021-05-23 PROCEDURE — 96365 THER/PROPH/DIAG IV INF INIT: CPT

## 2021-05-23 PROCEDURE — 96366 THER/PROPH/DIAG IV INF ADDON: CPT

## 2021-05-23 PROCEDURE — 85379 FIBRIN DEGRADATION QUANT: CPT

## 2021-05-23 PROCEDURE — 99285 EMERGENCY DEPT VISIT HI MDM: CPT

## 2021-05-23 PROCEDURE — 36415 COLL VENOUS BLD VENIPUNCTURE: CPT

## 2021-05-23 PROCEDURE — 96375 TX/PRO/DX INJ NEW DRUG ADDON: CPT

## 2021-05-23 PROCEDURE — 6360000002 HC RX W HCPCS: Performed by: STUDENT IN AN ORGANIZED HEALTH CARE EDUCATION/TRAINING PROGRAM

## 2021-05-23 PROCEDURE — 6360000002 HC RX W HCPCS: Performed by: INTERNAL MEDICINE

## 2021-05-23 PROCEDURE — 93010 ELECTROCARDIOGRAM REPORT: CPT | Performed by: INTERNAL MEDICINE

## 2021-05-23 PROCEDURE — 85025 COMPLETE CBC W/AUTO DIFF WBC: CPT

## 2021-05-23 RX ORDER — KETOROLAC TROMETHAMINE 30 MG/ML
15 INJECTION, SOLUTION INTRAMUSCULAR; INTRAVENOUS ONCE
Status: COMPLETED | OUTPATIENT
Start: 2021-05-23 | End: 2021-05-23

## 2021-05-23 RX ORDER — ASPIRIN 81 MG/1
81 TABLET, CHEWABLE ORAL DAILY
Status: DISCONTINUED | OUTPATIENT
Start: 2021-05-24 | End: 2021-05-25 | Stop reason: HOSPADM

## 2021-05-23 RX ORDER — HEPARIN SODIUM 1000 [USP'U]/ML
4000 INJECTION, SOLUTION INTRAVENOUS; SUBCUTANEOUS PRN
Status: DISCONTINUED | OUTPATIENT
Start: 2021-05-23 | End: 2021-05-23 | Stop reason: HOSPADM

## 2021-05-23 RX ORDER — ASPIRIN 81 MG/1
324 TABLET, CHEWABLE ORAL ONCE
Status: COMPLETED | OUTPATIENT
Start: 2021-05-23 | End: 2021-05-23

## 2021-05-23 RX ORDER — HEPARIN SODIUM 10000 [USP'U]/100ML
13.4 INJECTION, SOLUTION INTRAVENOUS CONTINUOUS
Status: DISCONTINUED | OUTPATIENT
Start: 2021-05-23 | End: 2021-05-24

## 2021-05-23 RX ORDER — HEPARIN SODIUM 1000 [USP'U]/ML
2000 INJECTION, SOLUTION INTRAVENOUS; SUBCUTANEOUS PRN
Status: DISCONTINUED | OUTPATIENT
Start: 2021-05-23 | End: 2021-05-23 | Stop reason: HOSPADM

## 2021-05-23 RX ORDER — HEPARIN SODIUM 1000 [USP'U]/ML
4000 INJECTION, SOLUTION INTRAVENOUS; SUBCUTANEOUS ONCE
Status: COMPLETED | OUTPATIENT
Start: 2021-05-23 | End: 2021-05-23

## 2021-05-23 RX ORDER — DEXTROSE MONOHYDRATE 50 MG/ML
100 INJECTION, SOLUTION INTRAVENOUS PRN
Status: DISCONTINUED | OUTPATIENT
Start: 2021-05-23 | End: 2021-05-25 | Stop reason: HOSPADM

## 2021-05-23 RX ORDER — POLYETHYLENE GLYCOL 3350 17 G/17G
17 POWDER, FOR SOLUTION ORAL DAILY PRN
Status: DISCONTINUED | OUTPATIENT
Start: 2021-05-23 | End: 2021-05-25 | Stop reason: HOSPADM

## 2021-05-23 RX ORDER — FLUTICASONE PROPIONATE 110 UG/1
2 AEROSOL, METERED RESPIRATORY (INHALATION) 2 TIMES DAILY
Status: DISCONTINUED | OUTPATIENT
Start: 2021-05-23 | End: 2021-05-25 | Stop reason: HOSPADM

## 2021-05-23 RX ORDER — ACETAMINOPHEN 325 MG/1
650 TABLET ORAL EVERY 6 HOURS PRN
Status: DISCONTINUED | OUTPATIENT
Start: 2021-05-23 | End: 2021-05-24

## 2021-05-23 RX ORDER — ALBUTEROL SULFATE 90 UG/1
2 AEROSOL, METERED RESPIRATORY (INHALATION) EVERY 6 HOURS PRN
Status: DISCONTINUED | OUTPATIENT
Start: 2021-05-23 | End: 2021-05-25 | Stop reason: HOSPADM

## 2021-05-23 RX ORDER — ATORVASTATIN CALCIUM 80 MG/1
80 TABLET, FILM COATED ORAL NIGHTLY
Status: DISCONTINUED | OUTPATIENT
Start: 2021-05-23 | End: 2021-05-25 | Stop reason: HOSPADM

## 2021-05-23 RX ORDER — HEPARIN SODIUM 1000 [USP'U]/ML
4000 INJECTION, SOLUTION INTRAVENOUS; SUBCUTANEOUS PRN
Status: DISCONTINUED | OUTPATIENT
Start: 2021-05-23 | End: 2021-05-24

## 2021-05-23 RX ORDER — NICOTINE POLACRILEX 4 MG
15 LOZENGE BUCCAL PRN
Status: DISCONTINUED | OUTPATIENT
Start: 2021-05-23 | End: 2021-05-25 | Stop reason: HOSPADM

## 2021-05-23 RX ORDER — PROMETHAZINE HYDROCHLORIDE 25 MG/1
12.5 TABLET ORAL EVERY 6 HOURS PRN
Status: DISCONTINUED | OUTPATIENT
Start: 2021-05-23 | End: 2021-05-25 | Stop reason: HOSPADM

## 2021-05-23 RX ORDER — SODIUM CHLORIDE 0.9 % (FLUSH) 0.9 %
5-40 SYRINGE (ML) INJECTION EVERY 12 HOURS SCHEDULED
Status: DISCONTINUED | OUTPATIENT
Start: 2021-05-23 | End: 2021-05-25 | Stop reason: HOSPADM

## 2021-05-23 RX ORDER — ACETAMINOPHEN 650 MG/1
650 SUPPOSITORY RECTAL EVERY 6 HOURS PRN
Status: DISCONTINUED | OUTPATIENT
Start: 2021-05-23 | End: 2021-05-25 | Stop reason: HOSPADM

## 2021-05-23 RX ORDER — HEPARIN SODIUM 1000 [USP'U]/ML
2000 INJECTION, SOLUTION INTRAVENOUS; SUBCUTANEOUS PRN
Status: DISCONTINUED | OUTPATIENT
Start: 2021-05-23 | End: 2021-05-24

## 2021-05-23 RX ORDER — SODIUM CHLORIDE 0.9 % (FLUSH) 0.9 %
5-40 SYRINGE (ML) INJECTION PRN
Status: DISCONTINUED | OUTPATIENT
Start: 2021-05-23 | End: 2021-05-25 | Stop reason: HOSPADM

## 2021-05-23 RX ORDER — NITROGLYCERIN 0.4 MG/1
0.4 TABLET SUBLINGUAL EVERY 5 MIN PRN
Status: DISCONTINUED | OUTPATIENT
Start: 2021-05-23 | End: 2021-05-25 | Stop reason: HOSPADM

## 2021-05-23 RX ORDER — DEXTROSE MONOHYDRATE 25 G/50ML
12.5 INJECTION, SOLUTION INTRAVENOUS PRN
Status: DISCONTINUED | OUTPATIENT
Start: 2021-05-23 | End: 2021-05-25 | Stop reason: HOSPADM

## 2021-05-23 RX ORDER — HEPARIN SODIUM 10000 [USP'U]/100ML
1000 INJECTION, SOLUTION INTRAVENOUS CONTINUOUS
Status: DISCONTINUED | OUTPATIENT
Start: 2021-05-23 | End: 2021-05-23 | Stop reason: HOSPADM

## 2021-05-23 RX ORDER — SODIUM CHLORIDE 9 MG/ML
25 INJECTION, SOLUTION INTRAVENOUS PRN
Status: DISCONTINUED | OUTPATIENT
Start: 2021-05-23 | End: 2021-05-25 | Stop reason: HOSPADM

## 2021-05-23 RX ORDER — ONDANSETRON 2 MG/ML
4 INJECTION INTRAMUSCULAR; INTRAVENOUS EVERY 6 HOURS PRN
Status: DISCONTINUED | OUTPATIENT
Start: 2021-05-23 | End: 2021-05-25 | Stop reason: HOSPADM

## 2021-05-23 RX ORDER — MORPHINE SULFATE 2 MG/ML
2 INJECTION, SOLUTION INTRAMUSCULAR; INTRAVENOUS EVERY 4 HOURS PRN
Status: DISCONTINUED | OUTPATIENT
Start: 2021-05-23 | End: 2021-05-25 | Stop reason: HOSPADM

## 2021-05-23 RX ADMIN — HEPARIN SODIUM 10 ML/HR: 10000 INJECTION, SOLUTION INTRAVENOUS at 22:18

## 2021-05-23 RX ADMIN — ATORVASTATIN CALCIUM 80 MG: 80 TABLET, FILM COATED ORAL at 22:18

## 2021-05-23 RX ADMIN — ASPIRIN 324 MG: 81 TABLET, CHEWABLE ORAL at 16:35

## 2021-05-23 RX ADMIN — NITROGLYCERIN 0.4 MG: 0.4 TABLET, ORALLY DISINTEGRATING SUBLINGUAL at 21:50

## 2021-05-23 RX ADMIN — HEPARIN SODIUM 1000 UNITS/HR: 10000 INJECTION, SOLUTION INTRAVENOUS at 17:16

## 2021-05-23 RX ADMIN — ACETAMINOPHEN 650 MG: 325 TABLET ORAL at 22:17

## 2021-05-23 RX ADMIN — KETOROLAC TROMETHAMINE 15 MG: 30 INJECTION, SOLUTION INTRAMUSCULAR; INTRAVENOUS at 13:01

## 2021-05-23 RX ADMIN — NITROGLYCERIN 0.4 MG: 0.4 TABLET, ORALLY DISINTEGRATING SUBLINGUAL at 22:11

## 2021-05-23 RX ADMIN — NITROGLYCERIN 0.4 MG: 0.4 TABLET, ORALLY DISINTEGRATING SUBLINGUAL at 22:40

## 2021-05-23 RX ADMIN — HEPARIN SODIUM 4000 UNITS: 1000 INJECTION INTRAVENOUS; SUBCUTANEOUS at 17:16

## 2021-05-23 RX ADMIN — MORPHINE SULFATE 2 MG: 2 INJECTION, SOLUTION INTRAMUSCULAR; INTRAVENOUS at 23:19

## 2021-05-23 ASSESSMENT — PAIN DESCRIPTION - PAIN TYPE
TYPE: ACUTE PAIN

## 2021-05-23 ASSESSMENT — PAIN SCALES - GENERAL
PAINLEVEL_OUTOF10: 3
PAINLEVEL_OUTOF10: 5
PAINLEVEL_OUTOF10: 7
PAINLEVEL_OUTOF10: 7
PAINLEVEL_OUTOF10: 0
PAINLEVEL_OUTOF10: 4
PAINLEVEL_OUTOF10: 9
PAINLEVEL_OUTOF10: 5
PAINLEVEL_OUTOF10: 4
PAINLEVEL_OUTOF10: 7
PAINLEVEL_OUTOF10: 7

## 2021-05-23 ASSESSMENT — PAIN DESCRIPTION - LOCATION
LOCATION: CHEST
LOCATION: CHEST;SHOULDER

## 2021-05-23 ASSESSMENT — PAIN DESCRIPTION - DESCRIPTORS
DESCRIPTORS: ACHING;PRESSURE
DESCRIPTORS: ACHING;PRESSURE
DESCRIPTORS: PRESSURE

## 2021-05-23 NOTE — ED NOTES
[id number] Call to [facility] for chest pain, elevated troponin, NSTEMI       [address]  05/23/21 [facility]  05/23/21 7228

## 2021-05-23 NOTE — ED PROVIDER NOTES
[address] [facility] [address] COMPLAINT  Chest Pain (pt c/o chest pressure that radiates from R to L shoulder and down center of chest)     HISTORY OF PRESENT ILLNESS  Santos Linares is a 41 y.o. female  who presents to the ED complaining of chest pain. Patient states that symptoms started around 10 AM today. She states that it is a sharp sensation that started in the center of her chest and that is now resolving but she is not having in her right and left axillas. She states that she was seen here for chest pain recently and at that time had a negative work-up and was discharged home. She states that she may have diabetes, is a smoker, also with history of hypertension hyperlipidemia. She also has a history of COPD. She denies any associated fevers, nausea vomiting, abdominal pain, diarrhea constipation, numbness or tingling, or other complaints or concerns. She took 2 full size aspirin prior to arrival.    No other complaints, modifying factors or associated symptoms. I have reviewed the following from the nursing documentation. Past Medical History:   Diagnosis Date    Obesity     Scoliosis      History reviewed. No pertinent surgical history. History reviewed. No pertinent family history.   Social History     Socioeconomic History    Marital status:      Spouse name: Not on file    Number of children: Not on file    Years of education: Not on file    Highest education level: Not on file   Occupational History    Not on file   Tobacco Use    Smoking status: Current Every Day Smoker     Packs/day: 2.00     Types: Cigarettes    Smokeless tobacco: Never Used   Substance and Sexual Activity    Alcohol use: Yes     Comment: occasionally    Drug use: Yes     Types: Marijuana    Sexual activity: Not on file   Other Topics Concern    Not on file   Social History Narrative    Not on file     Social Determinants of Health     Financial Resource Strain:     Difficulty of Paying Living Expenses:    Food Insecurity:     Worried About [facility] in the Last Year:     [address] [facility] in the Last Year:    Transportation Needs:     Lack of Transportation (Medical):      Lack of Transportation (Non-Medical):    Physical Activity:     Days of Exercise per Week:     Minutes of Exercise per Session:    Stress:     Feeling of Stress :    Social Connections:     Frequency of Communication with Friends and Family:     Frequency of Social Gatherings with Friends and Family:     Attends Oriental orthodox Services:     Active Member of Clubs or Organizations:     Attends Club or Organization Meetings:     Marital Status:    Intimate Partner Violence:     Fear of Current or Ex-Partner:     Emotionally Abused:     Physically Abused:     Sexually Abused:      Current Facility-Administered Medications   Medication Dose Route Frequency Provider Last Rate Last Admin    heparin (porcine) injection 4,000 Units  4,000 Units Intravenous PRJACIEL Bains MD        heparin (porcine) injection 2,000 Units  2,000 Units Intravenous PRN Sania Bains MD        heparin 25,000 units in dextrose 5% 250 mL (premix) infusion  1,000 Units/hr Intravenous Continuous Sania Bains MD 10 mL/hr at 05/23/21 1716 1,000 Units/hr at 05/23/21 1716     Current Outpatient Medications   Medication Sig Dispense Refill    aspirin 325 MG tablet Take 325 mg by mouth daily      ibuprofen (ADVIL;MOTRIN) 200 MG tablet Take 200 mg by mouth every 6 hours as needed for Pain      naproxen (NAPROSYN) 500 MG tablet Take 500 mg by mouth 2 times daily (with meals)      metFORMIN (GLUCOPHAGE) 1000 MG tablet Take 1 tablet by mouth 2 times daily (with meals) 60 tablet 5    albuterol sulfate  (90 Base) MCG/ACT inhaler Inhale 2 puffs into the lungs every 6 hours as needed for Wheezing      beclomethasone (QVAR) 80 MCG/ACT inhaler Inhale 1 puff into the lungs 2 times daily       No Known Allergies    REVIEW OF SYSTEMS  10 systems reviewed, pertinent positives per HPI otherwise noted to be negative. PHYSICAL EXAM  /79   Pulse 77   Temp 98.2 °F (36.8 °C) (Oral)   Resp 16   Ht 5' 8\" (1.727 m)   Wt 296 lb (134.3 kg)   LMP 04/30/2021   SpO2 99%   BMI 45.01 kg/m²    GENERAL APPEARANCE: Awake and alert. Cooperative. No acute distress. HENT: Normocephalic. Atraumatic. NECK: Supple. EYES: PERRL. EOM's grossly intact. HEART/CHEST: RRR. No murmurs. No parasternal tenderness palpation. There is tenderness palpation in the bilateral axilla over the pectoral muscles. LUNGS: Respirations unlabored. CTAB. Good air exchange. Speaking comfortably in full sentences. ABDOMEN: No tenderness. Soft. Non-distended. No masses. No organomegaly. No guarding or rebound. MUSCULOSKELETAL: No extremity edema. Compartments soft. No deformity. No tenderness in the extremities. All extremities neurovascularly intact. SKIN: Warm and dry. No acute rashes. NEUROLOGICAL: Alert and oriented. CN's 2-12 intact. No gross facial drooping. Strength 5/5, sensation intact. PSYCHIATRIC: Normal mood and affect. LABS  I have reviewed all labs for this visit.    Results for orders placed or performed during the hospital encounter of 05/23/21   CBC auto differential   Result Value Ref Range    WBC 11.8 (H) 4.0 - 11.0 K/uL    RBC 5.22 (H) 4.00 - 5.20 M/uL    Hemoglobin 15.8 12.0 - 16.0 g/dL    Hematocrit 47.1 36.0 - 48.0 %    MCV 90.3 80.0 - 100.0 fL    MCH 30.3 26.0 - 34.0 pg    MCHC 33.5 31.0 - 36.0 g/dL    RDW 14.1 12.4 - 15.4 %    Platelets 350 295 - 092 K/uL    MPV 9.6 5.0 - 10.5 fL    Neutrophils % 66.4 %    Lymphocytes % 27.0 %    Monocytes % 4.4 %    Eosinophils % 1.2 %    Basophils % 1.0 %    Neutrophils Absolute 7.9 (H) 1.7 - 7.7 K/uL    Lymphocytes Absolute 3.2 1.0 - 5.1 K/uL    Monocytes Absolute 0.5 0.0 - 1.3 K/uL    Eosinophils Absolute 0.1 0.0 - 0.6 K/uL    Basophils Absolute 0.1 0.0 - 0.2 K/uL   Comprehensive Metabolic Panel w/ Reflex to MG   Result Value Ref Range    Sodium 140 136 - 145 mmol/L    Potassium reflex Magnesium 3.7 3.5 - 5.1 mmol/L    Chloride 103 99 - 110 mmol/L    CO2 25 21 - 32 mmol/L    Anion Gap 12 3 - 16    Glucose 244 (H) 70 - 99 mg/dL    BUN 7 7 - 20 mg/dL    CREATININE 0.6 0.6 - 1.1 mg/dL    GFR Non-African American >60 >60    GFR African American >60 >60    Calcium 10.0 8.3 - 10.6 mg/dL    Total Protein 6.7 6.4 - 8.2 g/dL    Albumin 3.9 3.4 - 5.0 g/dL    Albumin/Globulin Ratio 1.4 1.1 - 2.2    Total Bilirubin 0.3 0.0 - 1.0 mg/dL    Alkaline Phosphatase 109 40 - 129 U/L    ALT 13 10 - 40 U/L    AST 16 15 - 37 U/L    Globulin 2.8 g/dL   Troponin   Result Value Ref Range    Troponin <0.01 <0.01 ng/mL   D-dimer, quantitative   Result Value Ref Range    D-Dimer, Quant <200 0 - 229 ng/mL DDU   Troponin   Result Value Ref Range    Troponin 0.10 (H) <0.01 ng/mL   EKG 12 Lead   Result Value Ref Range    Ventricular Rate 77 BPM    Atrial Rate 77 BPM    P-R Interval 200 ms    QRS Duration 94 ms    Q-T Interval 390 ms    QTc Calculation (Bazett) 441 ms    P Axis 25 degrees    R Axis 38 degrees    T Axis 59 degrees    Diagnosis       Normal sinus rhythm with sinus arrhythmiaNormal ECGConfirmed by Tati Santos MD, Mateus Garaz ([id number]) on 5/23/2021 2:06:00 PM   EKG 12 Lead   Result Value Ref Range    Ventricular Rate 81 BPM    Atrial Rate 81 BPM    P-R Interval 194 ms    QRS Duration 90 ms    Q-T Interval 386 ms    QTc Calculation (Bazett) 448 ms    P Axis 24 degrees    R Axis 19 degrees    T Axis 59 degrees    Diagnosis       Normal sinus rhythmNormal ECGWhen compared with ECG of 23-MAY-2021 12:24,No significant change was found       ECG  The Ekg interpreted by me shows  normal sinus rhythm with a rate of 77, sinus arrhythmia  Axis is   Normal  QTc is  normal  Intervals and Durations are unremarkable.       ST Segments: normal  No significant change from prior EKG dated 5/14/2021    The Ekg interpreted by me shows  normal sinus rhythm with a rate of 81  Axis is   Normal  QTc is  normal  Intervals and Durations are unremarkable. ST Segments: normal  No significant change from prior EKG dated 5/14/2021    RADIOLOGY  XR CHEST PORTABLE   Final Result   No acute cardiopulmonary disease. ED COURSE/MDM  Patient seen and evaluated. Old records reviewed. Labs and imaging reviewed and results discussed with patient. Patient is a 25-year-old female, history of hypertension, hyperlipidemia, recent diagnosis of diabetes, and obesity, presenting for the second time in 2 weeks for substernal chest pain. Full HPI as detailed above. Upon arrival in the ED, vitals reassuring. Patient is resting comfortably and is in no acute distress. Physical exam reveals no parasternal tenderness to palpation, however does have tenderness palpation in the bilateral axilla over the pectoral muscles. EKG initially without acute ischemia. Labs were performed, did reveal elevated glucose of 244 however no evidence of acidosis. Renal functions normal.  Initial troponin was negative, 3-hour repeat was found to be 0.1. Repeat EKG was ordered without any acute changes. Chest x-ray without acute findings. Cardiology was consulted and spoke to Dr. Cristopher Cintron. Patient will be started on heparin drip and transferred to [facility] for NSTEMI. Findings were discussed with patient was comfortable in agreement with plan of care.     During the patient's ED course, the patient was given:  Medications   heparin (porcine) injection 4,000 Units (has no administration in time range)   heparin (porcine) injection 2,000 Units (has no administration in time range)   heparin 25,000 units in dextrose 5% 250 mL (premix) infusion (1,000 Units/hr Intravenous New Bag 5/23/21 1716)   ketorolac (TORADOL) injection 15 mg (15 mg Intravenous Given 5/23/21 1301)   aspirin chewable tablet 324 mg (324 mg Oral Given 5/23/21 1635)   heparin (porcine) injection 4,000 Units (4,000 Units Intravenous Given 5/23/21 8095)        CLINICAL IMPRESSION  1. NSTEMI (non-ST elevated myocardial infarction) (HCC)    2. Chest pain, unspecified type    3. Elevated troponin        Blood pressure 125/79, pulse 77, temperature 98.2 °F (36.8 °C), temperature source Oral, resp. rate 16, height 5' 8\" (1.727 m), weight 296 lb (134.3 kg), last menstrual period 04/30/2021, SpO2 99 %, not currently breastfeeding. DISPOSITION  Agustina Quintanilla was transferred to [facility] in stable condition. Patient was given scripts for the following medications. I counseled patient how to take these medications. New Prescriptions    No medications on file       Follow-up with:  No follow-up provider specified. DISCLAIMER: This chart was created using Dragon dictation software. Efforts were made by me to ensure accuracy, however some errors may be present due to limitations of this technology and occasionally words are not transcribed correctly.        Juan Jose Lora MD  05/23/21 9620

## 2021-05-23 NOTE — ED NOTES
Pt report given to [facility] team. Pt remains alert and oriented, no apparent distress, vitals WNL. Transport team denies further questions. Pt care transferred at this time. Report called to Catrina Billingsley, spoke to [employer]. RN denies further questions. Pt transferred at this time.      Katlyn Bradley, RAO  05/23/21 4732

## 2021-05-24 ENCOUNTER — APPOINTMENT (OUTPATIENT)
Dept: CARDIAC CATH/INVASIVE PROCEDURES | Age: 41
DRG: 281 | End: 2021-05-24
Attending: INTERNAL MEDICINE

## 2021-05-24 LAB
ANION GAP SERPL CALCULATED.3IONS-SCNC: 8 MMOL/L (ref 3–16)
APTT: 42.8 SEC (ref 24.2–36.2)
BUN BLDV-MCNC: 11 MG/DL (ref 7–20)
CALCIUM SERPL-MCNC: 9.6 MG/DL (ref 8.3–10.6)
CHLORIDE BLD-SCNC: 103 MMOL/L (ref 99–110)
CHOLESTEROL, TOTAL: 147 MG/DL (ref 0–199)
CO2: 28 MMOL/L (ref 21–32)
CREAT SERPL-MCNC: 0.7 MG/DL (ref 0.6–1.1)
EKG ATRIAL RATE: 74 BPM
EKG ATRIAL RATE: 75 BPM
EKG ATRIAL RATE: 75 BPM
EKG ATRIAL RATE: 81 BPM
EKG DIAGNOSIS: NORMAL
EKG P AXIS: 15 DEGREES
EKG P AXIS: 19 DEGREES
EKG P AXIS: 24 DEGREES
EKG P AXIS: 31 DEGREES
EKG P-R INTERVAL: 194 MS
EKG P-R INTERVAL: 198 MS
EKG P-R INTERVAL: 200 MS
EKG P-R INTERVAL: 206 MS
EKG Q-T INTERVAL: 386 MS
EKG Q-T INTERVAL: 392 MS
EKG Q-T INTERVAL: 394 MS
EKG Q-T INTERVAL: 398 MS
EKG QRS DURATION: 84 MS
EKG QRS DURATION: 90 MS
EKG QRS DURATION: 90 MS
EKG QRS DURATION: 92 MS
EKG QTC CALCULATION (BAZETT): 437 MS
EKG QTC CALCULATION (BAZETT): 437 MS
EKG QTC CALCULATION (BAZETT): 444 MS
EKG QTC CALCULATION (BAZETT): 448 MS
EKG R AXIS: 19 DEGREES
EKG R AXIS: 26 DEGREES
EKG R AXIS: 30 DEGREES
EKG R AXIS: 49 DEGREES
EKG T AXIS: 51 DEGREES
EKG T AXIS: 59 DEGREES
EKG T AXIS: 60 DEGREES
EKG T AXIS: 62 DEGREES
EKG VENTRICULAR RATE: 74 BPM
EKG VENTRICULAR RATE: 75 BPM
EKG VENTRICULAR RATE: 75 BPM
EKG VENTRICULAR RATE: 81 BPM
ESTIMATED AVERAGE GLUCOSE: 277.6 MG/DL
GFR AFRICAN AMERICAN: >60
GFR NON-AFRICAN AMERICAN: >60
GLUCOSE BLD-MCNC: 184 MG/DL (ref 70–99)
GLUCOSE BLD-MCNC: 193 MG/DL (ref 70–99)
GLUCOSE BLD-MCNC: 200 MG/DL (ref 70–99)
GLUCOSE BLD-MCNC: 217 MG/DL (ref 70–99)
GLUCOSE BLD-MCNC: 221 MG/DL (ref 70–99)
HBA1C MFR BLD: 11.3 %
HCT VFR BLD CALC: 42.4 % (ref 36–48)
HDLC SERPL-MCNC: 25 MG/DL (ref 40–60)
HEMOGLOBIN: 14.4 G/DL (ref 12–16)
LDL CHOLESTEROL CALCULATED: 64 MG/DL
LV EF: 40 %
LVEF MODALITY: NORMAL
MAGNESIUM: 1.6 MG/DL (ref 1.8–2.4)
MCH RBC QN AUTO: 30.6 PG (ref 26–34)
MCHC RBC AUTO-ENTMCNC: 34 G/DL (ref 31–36)
MCV RBC AUTO: 90 FL (ref 80–100)
PDW BLD-RTO: 13.9 % (ref 12.4–15.4)
PERFORMED ON: ABNORMAL
PLATELET # BLD: 180 K/UL (ref 135–450)
PMV BLD AUTO: 9.6 FL (ref 5–10.5)
POTASSIUM REFLEX MAGNESIUM: 3.5 MMOL/L (ref 3.5–5.1)
RBC # BLD: 4.71 M/UL (ref 4–5.2)
SODIUM BLD-SCNC: 139 MMOL/L (ref 136–145)
TRIGL SERPL-MCNC: 292 MG/DL (ref 0–150)
TROPONIN: 0.06 NG/ML
VLDLC SERPL CALC-MCNC: 58 MG/DL
WBC # BLD: 11.4 K/UL (ref 4–11)

## 2021-05-24 PROCEDURE — 2580000003 HC RX 258: Performed by: INTERNAL MEDICINE

## 2021-05-24 PROCEDURE — 83036 HEMOGLOBIN GLYCOSYLATED A1C: CPT

## 2021-05-24 PROCEDURE — 6360000004 HC RX CONTRAST MEDICATION

## 2021-05-24 PROCEDURE — 6370000000 HC RX 637 (ALT 250 FOR IP)

## 2021-05-24 PROCEDURE — 2709999900 HC NON-CHARGEABLE SUPPLY

## 2021-05-24 PROCEDURE — 36415 COLL VENOUS BLD VENIPUNCTURE: CPT

## 2021-05-24 PROCEDURE — 84484 ASSAY OF TROPONIN QUANT: CPT

## 2021-05-24 PROCEDURE — C1894 INTRO/SHEATH, NON-LASER: HCPCS

## 2021-05-24 PROCEDURE — B2111ZZ FLUOROSCOPY OF MULTIPLE CORONARY ARTERIES USING LOW OSMOLAR CONTRAST: ICD-10-PCS | Performed by: INTERNAL MEDICINE

## 2021-05-24 PROCEDURE — 4A023N7 MEASUREMENT OF CARDIAC SAMPLING AND PRESSURE, LEFT HEART, PERCUTANEOUS APPROACH: ICD-10-PCS | Performed by: INTERNAL MEDICINE

## 2021-05-24 PROCEDURE — C1769 GUIDE WIRE: HCPCS

## 2021-05-24 PROCEDURE — 83735 ASSAY OF MAGNESIUM: CPT

## 2021-05-24 PROCEDURE — 94640 AIRWAY INHALATION TREATMENT: CPT

## 2021-05-24 PROCEDURE — 99255 IP/OBS CONSLTJ NEW/EST HI 80: CPT | Performed by: INTERNAL MEDICINE

## 2021-05-24 PROCEDURE — 93458 L HRT ARTERY/VENTRICLE ANGIO: CPT

## 2021-05-24 PROCEDURE — 85730 THROMBOPLASTIN TIME PARTIAL: CPT

## 2021-05-24 PROCEDURE — B2151ZZ FLUOROSCOPY OF LEFT HEART USING LOW OSMOLAR CONTRAST: ICD-10-PCS | Performed by: INTERNAL MEDICINE

## 2021-05-24 PROCEDURE — 93458 L HRT ARTERY/VENTRICLE ANGIO: CPT | Performed by: INTERNAL MEDICINE

## 2021-05-24 PROCEDURE — 6370000000 HC RX 637 (ALT 250 FOR IP): Performed by: INTERNAL MEDICINE

## 2021-05-24 PROCEDURE — 85027 COMPLETE CBC AUTOMATED: CPT

## 2021-05-24 PROCEDURE — 80048 BASIC METABOLIC PNL TOTAL CA: CPT

## 2021-05-24 PROCEDURE — 2500000003 HC RX 250 WO HCPCS

## 2021-05-24 PROCEDURE — 6360000002 HC RX W HCPCS

## 2021-05-24 PROCEDURE — 93010 ELECTROCARDIOGRAM REPORT: CPT | Performed by: INTERNAL MEDICINE

## 2021-05-24 PROCEDURE — 2060000000 HC ICU INTERMEDIATE R&B

## 2021-05-24 PROCEDURE — 6360000002 HC RX W HCPCS: Performed by: INTERNAL MEDICINE

## 2021-05-24 PROCEDURE — 80061 LIPID PANEL: CPT

## 2021-05-24 RX ORDER — METOPROLOL SUCCINATE 25 MG/1
25 TABLET, EXTENDED RELEASE ORAL DAILY
Status: DISCONTINUED | OUTPATIENT
Start: 2021-05-24 | End: 2021-05-25 | Stop reason: HOSPADM

## 2021-05-24 RX ORDER — FENTANYL CITRATE 50 UG/ML
INJECTION, SOLUTION INTRAMUSCULAR; INTRAVENOUS
Status: COMPLETED | OUTPATIENT
Start: 2021-05-24 | End: 2021-05-24

## 2021-05-24 RX ORDER — LISINOPRIL 2.5 MG/1
2.5 TABLET ORAL DAILY
Status: DISCONTINUED | OUTPATIENT
Start: 2021-05-24 | End: 2021-05-25 | Stop reason: HOSPADM

## 2021-05-24 RX ORDER — HEPARIN SODIUM 1000 [USP'U]/ML
2000 INJECTION, SOLUTION INTRAVENOUS; SUBCUTANEOUS ONCE
Status: COMPLETED | OUTPATIENT
Start: 2021-05-24 | End: 2021-05-24

## 2021-05-24 RX ORDER — SODIUM CHLORIDE 9 MG/ML
25 INJECTION, SOLUTION INTRAVENOUS PRN
Status: DISCONTINUED | OUTPATIENT
Start: 2021-05-24 | End: 2021-05-24

## 2021-05-24 RX ORDER — MIDAZOLAM HYDROCHLORIDE 5 MG/ML
INJECTION INTRAMUSCULAR; INTRAVENOUS
Status: COMPLETED | OUTPATIENT
Start: 2021-05-24 | End: 2021-05-24

## 2021-05-24 RX ORDER — ISOSORBIDE MONONITRATE 30 MG/1
30 TABLET, EXTENDED RELEASE ORAL DAILY
Status: DISCONTINUED | OUTPATIENT
Start: 2021-05-24 | End: 2021-05-25 | Stop reason: HOSPADM

## 2021-05-24 RX ORDER — HEPARIN SODIUM 1000 [USP'U]/ML
INJECTION, SOLUTION INTRAVENOUS; SUBCUTANEOUS
Status: COMPLETED | OUTPATIENT
Start: 2021-05-24 | End: 2021-05-24

## 2021-05-24 RX ORDER — ACETAMINOPHEN 325 MG/1
650 TABLET ORAL EVERY 4 HOURS PRN
Status: DISCONTINUED | OUTPATIENT
Start: 2021-05-24 | End: 2021-05-25 | Stop reason: HOSPADM

## 2021-05-24 RX ORDER — SODIUM CHLORIDE 0.9 % (FLUSH) 0.9 %
5-40 SYRINGE (ML) INJECTION PRN
Status: DISCONTINUED | OUTPATIENT
Start: 2021-05-24 | End: 2021-05-24

## 2021-05-24 RX ORDER — SODIUM CHLORIDE 0.9 % (FLUSH) 0.9 %
5-40 SYRINGE (ML) INJECTION EVERY 12 HOURS SCHEDULED
Status: DISCONTINUED | OUTPATIENT
Start: 2021-05-24 | End: 2021-05-24

## 2021-05-24 RX ADMIN — LISINOPRIL 2.5 MG: 2.5 TABLET ORAL at 10:37

## 2021-05-24 RX ADMIN — ISOSORBIDE MONONITRATE 30 MG: 30 TABLET, EXTENDED RELEASE ORAL at 14:12

## 2021-05-24 RX ADMIN — Medication 2 PUFF: at 20:11

## 2021-05-24 RX ADMIN — INSULIN LISPRO 1 UNITS: 100 INJECTION, SOLUTION INTRAVENOUS; SUBCUTANEOUS at 21:28

## 2021-05-24 RX ADMIN — METOPROLOL SUCCINATE 25 MG: 25 TABLET, EXTENDED RELEASE ORAL at 10:37

## 2021-05-24 RX ADMIN — HEPARIN SODIUM 2000 UNITS: 1000 INJECTION INTRAVENOUS; SUBCUTANEOUS at 01:25

## 2021-05-24 RX ADMIN — HEPARIN SODIUM 2000 UNITS: 1000 INJECTION INTRAVENOUS; SUBCUTANEOUS at 08:45

## 2021-05-24 RX ADMIN — ACETAMINOPHEN 650 MG: 325 TABLET ORAL at 23:39

## 2021-05-24 RX ADMIN — ASPIRIN 81 MG: 81 TABLET, CHEWABLE ORAL at 08:45

## 2021-05-24 RX ADMIN — INSULIN LISPRO 2 UNITS: 100 INJECTION, SOLUTION INTRAVENOUS; SUBCUTANEOUS at 16:59

## 2021-05-24 RX ADMIN — Medication 10 ML: at 21:28

## 2021-05-24 RX ADMIN — MORPHINE SULFATE 2 MG: 2 INJECTION, SOLUTION INTRAMUSCULAR; INTRAVENOUS at 13:14

## 2021-05-24 RX ADMIN — HEPARIN SODIUM 5000 UNITS: 1000 INJECTION, SOLUTION INTRAVENOUS; SUBCUTANEOUS at 09:24

## 2021-05-24 RX ADMIN — NITROGLYCERIN 0.4 MG: 0.4 TABLET, ORALLY DISINTEGRATING SUBLINGUAL at 14:06

## 2021-05-24 RX ADMIN — MIDAZOLAM HYDROCHLORIDE 2 MG: 5 INJECTION INTRAMUSCULAR; INTRAVENOUS at 09:21

## 2021-05-24 RX ADMIN — FENTANYL CITRATE 50 MCG: 50 INJECTION, SOLUTION INTRAMUSCULAR; INTRAVENOUS at 09:21

## 2021-05-24 RX ADMIN — TICAGRELOR 90 MG: 90 TABLET ORAL at 21:28

## 2021-05-24 RX ADMIN — INSULIN LISPRO 1 UNITS: 100 INJECTION, SOLUTION INTRAVENOUS; SUBCUTANEOUS at 11:47

## 2021-05-24 RX ADMIN — ACETAMINOPHEN 650 MG: 325 TABLET ORAL at 19:53

## 2021-05-24 RX ADMIN — NITROGLYCERIN 0.4 MG: 0.4 TABLET, ORALLY DISINTEGRATING SUBLINGUAL at 14:16

## 2021-05-24 RX ADMIN — ACETAMINOPHEN 650 MG: 325 TABLET ORAL at 14:45

## 2021-05-24 RX ADMIN — ATORVASTATIN CALCIUM 80 MG: 80 TABLET, FILM COATED ORAL at 21:28

## 2021-05-24 ASSESSMENT — ENCOUNTER SYMPTOMS
COUGH: 0
NAUSEA: 0
SHORTNESS OF BREATH: 1
VOMITING: 0
ABDOMINAL PAIN: 0
RHINORRHEA: 0
CONSTIPATION: 0
EYE PAIN: 0
SORE THROAT: 0
DIARRHEA: 0
PHOTOPHOBIA: 0
BLOOD IN STOOL: 0

## 2021-05-24 ASSESSMENT — PAIN DESCRIPTION - PAIN TYPE: TYPE: ACUTE PAIN

## 2021-05-24 ASSESSMENT — PAIN SCALES - GENERAL
PAINLEVEL_OUTOF10: 2
PAINLEVEL_OUTOF10: 5
PAINLEVEL_OUTOF10: 8

## 2021-05-24 ASSESSMENT — PAIN DESCRIPTION - LOCATION: LOCATION: HEAD

## 2021-05-24 NOTE — CONSULTS
[facility]  [phone]      Attending Physician: Claus Ross MD  Reason for Consultation/Chief Complaint: NSTEMI    Subjective   History of Present Illness:  Drake Gustafson is a 41 y.o. female with a history of morbid obesity, essential hypertension, hyperlipidemia, type II DM, asthma, and tobacco use who presented with chest pain. The patient reports that for the past 2 weeks, she has had multiple episodes of intense substernal chest pressure and shortness of breath that have occurred both with relatively light exertion such as when she is cleaning her house and also at rest.  She was initially seen in the ED for these symptoms on 5/14/21. ECG at that time showed normal sinus rhythm and troponins were negative x2. She was discharged home, but continued to have symptoms on and off. Yesterday, after using the bathroom, she says that he had a severe episode of substernal chest pressure that radiated to her bilateral shoulders. She became diaphoretic and very shortness of breath. Her symptoms persisted for several hours so she came back to the ED. On arrival to the ED, she was afebrile and hemodynamically. Her ECG showed normal sinus rhythm with no ischemic changes. Troponins trended 0.10-->0.24-->0.06 and she was subsequently started on an IV heparin infusion. She currently remains hemodynamically stable and is free of chest pain. Additionally, the patient notes that she has been under a great deal of stress recently. She says that her 15year-old daughter has been trying to run away from home and they have had to go to court over this. The patient started smoking 1 pack of cigarettes daily when she was 13years old, but has increased to 2 packs per day recently due to the increased emotional stress. She denies previous heavy alcohol consumption, but says that she did have 3 wine coolers the other night.   She previously smoked marijuana, but quit a few weeks ago.  She denies any other recreational drug use. Past Medical History:   has a past medical history of Asthma, COPD (chronic obstructive pulmonary disease) ([address] Diabetes mellitus ([address] Hyperlipidemia, Hypertension, Obesity, and Scoliosis. Surgical History:   has no past surgical history on file. Social History:   reports that she has been smoking cigarettes. She has a 29.00 pack-year smoking history. She has never used smokeless tobacco. She reports current alcohol use. She reports current drug use. Drug: Marijuana. Family History:  Notable for CAD, PVD, and multiple stents in brother and unspecified heart disease in her sister. Home Medications:  Were reviewed and are listed in nursing record and/or below  Prior to Admission medications    Medication Sig Start Date End Date Taking?  Authorizing Provider   aspirin 325 MG tablet Take 325 mg by mouth daily    Historical Provider, MD   ibuprofen (ADVIL;MOTRIN) 200 MG tablet Take 200 mg by mouth every 6 hours as needed for Pain    Historical Provider, MD   naproxen (NAPROSYN) 500 MG tablet Take 500 mg by mouth 2 times daily (with meals)    Historical Provider, MD   metFORMIN (GLUCOPHAGE) 1000 MG tablet Take 1 tablet by mouth 2 times daily (with meals) 5/14/21   Ashtyn Garcia MD   albuterol sulfate  (90 Base) MCG/ACT inhaler Inhale 2 puffs into the lungs every 6 hours as needed for Wheezing    Historical Provider, MD   beclomethasone (QVAR) 80 MCG/ACT inhaler Inhale 1 puff into the lungs 2 times daily    Historical Provider, MD        CURRENT Medications:  albuterol sulfate  (90 Base) MCG/ACT inhaler 2 puff, Q6H PRN  fluticasone (FLOVENT HFA) 110 MCG/ACT inhaler 2 puff, BID  glucose (GLUTOSE) 40 % oral gel 15 g, PRN  dextrose 50 % IV solution, PRN  glucagon (rDNA) injection 1 mg, PRN  dextrose 5 % solution, PRN  insulin lispro (HUMALOG) injection vial 0-6 Units, TID WC  insulin lispro (HUMALOG) injection vial 0-3 Units, Nightly  sodium Negative for adenopathy. Does not bruise/bleed easily. Psychiatric/Behavioral: Positive for dysphoric mood. The patient is not nervous/anxious. Objective   PHYSICAL EXAM:    Vitals:    05/24/21 0843   BP: 125/82   Pulse: 84   Resp: 20   Temp: 98 °F (36.7 °C)   SpO2: 98%    Weight: 268 lb 8 oz (121.8 kg)       General: Morbidly obese adult female in no acute distress. Pleasant and interactive on exam.  HEENT: Normocephalic, atraumatic, non-icteric, hearing intact, nares normal, mucous membranes moist.  Neck: Supple, trachea midline. No adenopathy. No thyromegaly. No JVD. Heart: Regular rate and rhythm. Normal S1 and S2. No murmurs, gallops or rubs. Lungs: Normal respiratory effort. Clear to auscultation bilaterally. No wheezes, rales, or rhonchi. Abdomen: Soft, non-tender. Normoactive bowel sounds. No masses or organomegaly. Skin: No rashes, wounds, or lesions. Pulses: 2+ and symmetric. Extremities: No clubbing, cyanosis, or edema. Musculoskeletal: Spontaneously moves all four extremities. Psych: Normal mood and affect. Neuro: Alert and oriented to person, place, and time. No focal deficits noted.       Labs   CBC:   Lab Results   Component Value Date    WBC 11.4 05/24/2021    RBC 4.71 05/24/2021    HGB 14.4 05/24/2021    HCT 42.4 05/24/2021    MCV 90.0 05/24/2021    RDW 13.9 05/24/2021     05/24/2021     CMP:  Lab Results   Component Value Date     05/24/2021    K 3.5 05/24/2021     05/24/2021    CO2 28 05/24/2021    BUN 11 05/24/2021    CREATININE 0.7 05/24/2021    GFRAA >60 05/24/2021    AGRATIO 1.4 05/23/2021    LABGLOM >60 05/24/2021    GLUCOSE 193 05/24/2021    PROT 6.7 05/23/2021    CALCIUM 9.6 05/24/2021    BILITOT 0.3 05/23/2021    ALKPHOS 109 05/23/2021    AST 16 05/23/2021    ALT 13 05/23/2021     PT/INR:  No results found for: PTINR  HgBA1c:No results found for: LABA1C  Lab Results   Component Value Date    TROPONINI 0.06 (H) 05/24/2021         Cardiac Data     Last EKG: Normal sinus rhythm, HR 75 bpm.     Echo: N/A    Stress Test: N/A    Cath: N/A    Studies:   Chest x-ray 5/23/21:  No acute cardiopulmonary disease. Assessment and Plan      1. NSTEMI - Overall, patient has multiple risk factors for CAD and presentation and troponin trend are concerning for type 1 process. Suspicious for possible LCx lesion given absence of ischemic changes on ECG. -Reviewed risks/benefits of invasive coronary angiography with possible percutaneous coronary intervention and patient is agreeable to proceeding with procedure  -Please keep NPO and will proceed with invasive angiography this morning.  -Continue ACS protocol IV heparin infusion  -Continue aspirin 81 mg daily and atorvastatin 80 mg daily  -Start metoprolol succinate 25 mg daily  -Obtain TTE for formal evaluation of cardiac structure and function  -OK to stop trending troponins  -Monitor on telemetry and repeat ECG for any rhythm changes or new episodes of chest pain    2. Essential hypertension - BP currently controlled. -Start metoprolol succinate 25 mg daily as above    3. Hyperlipidemia  -Lipid panel pending  -Continue atorvastatin 80 mg daily    4. Type II DM  -On sliding scale insulin    5. Morbid obesity  -BMI 40.89  -Will benefit from weight loss through dietary modifications and regular physical exercise    6. At risk for sleep apnea  -Consider formal outpatient sleep study for further evaluation    7. Tobacco use  -Reviewed risks of continued use and strongly encouraged smoking cessation      Thank you for allowing us to participate in the care of Amelia Mancera. Please call me with any questions [phone]. Arcelia Andrade DO  [facility]  [phone] [facility]  [phone] [address] [place] [address]  5/24/2021 8:47 AM      I will address the patient's cardiac risk factors and adjusted pharmacologic treatment as needed.  In addition, I have reinforced the need for patient directed risk factor modification. All questions and concerns were addressed to the patient/family. Alternatives to my treatment were discussed. The note was completed using EMR. Every effort was made to ensure accuracy; however, inadvertent computerized transcription errors may be present.

## 2021-05-24 NOTE — PROGRESS NOTES
Pt called out c/o severe chest pain. On arrival to room, pt rating pain 9-10/10, describes it as sharp \"pinching\", near hyperventilating. Nitro SL X1 administered, O2 at 2L placed and pt coached to slow down breathing, EKG performed and was normal. MD paged (see below). \"5/23/21 10:26 PM   [phone] Hospital or Facility: [facility] From: Lashae Olivo RE: Duane Romano 1980 RM: [id number] NSTEMI. Pt stated pain had resolved after the nitro I gave when you were there, then called out c/o severe chest pain soon after I left the room. Rated 9-10/10 and was almost hyperventilating, extremely anxious. 2L O2 applied, troponin increased to 0.24. EKG we just obtained reads \"NSR, normal ECG\". Pain down to 6/10 after second nitro. Thoughts/orders? Thanks Need Callback: NO CALLBACK REQ C4 PROGRESSIVE CARE NEW ADMISSION ROUTINE  Read 10:41 PM\"    Orders received for prn morphine. Administered; pt rates discomfort as 1/10 now \"I barely feel it at all\" and is able to rest comfortably in bed.

## 2021-05-24 NOTE — CONSULTS
Pharmacy to Manage Heparin Infusion per [facility]    Dx: Elevated Troponin  Pt wt = 87.1 kg - adjusted. Baseline aPTT = 29.7 sec at 1230. Low Dose Heparin Infusion  Heparin 60 units/kg IVP bolus followed by Heparin infusion at 12 units/kg/hr (recommended initial max dose 1000 units./hr). Recheck aPTT in 6 hours. Goal aPTT = 49-76 seconds. Patient comes from [place] with heparin gtt running at 10 ml/hr. Will check an aPTT at 2330. Nicky Ross, PharmD  5/23/2021 10:10 PM    5/24/2021  Recent Labs     05/23/21  2329   APTT 38.0*     Will give bolus of 2000 units and increase heparin gtt to 11.7 mL/hr. Recheck aPTT in 6 hours.   Nicky Ross, PharmANAHI  5/24/2021 12:24 AM    5/4 0618  aPTT = 42.8 sec  Heparin 2000 units bolus and increased drip rate to 13.4 ml/hr  Recheck aPTT in 6 hours at [facility], [address] [place] [address] PharmD  5/24/2021 at 7:53 AM

## 2021-05-24 NOTE — PROGRESS NOTES
B/P 130/82, pain \"9\", 1 SL nitro given. Santos Chan RN  5/24/2021    1430 B/P 128/75, chest pain has decreased to \"4\". Pt declines the need for any more SL nitro at this time.   Santos Chan RN  5/24/2021

## 2021-05-24 NOTE — PROGRESS NOTES
RESPIRATORY THERAPY ASSESSMENT    Name:  Nina [address] Record Number:  [id number]  Age: 41 y.o. Gender: female  : 1980  Today's Date:  2021  Room:  [facility]6733-50    Assessment     Is the patient being admitted for a COPD or Asthma exacerbation? No   (If yes the patient will be seen every 4 hours for the first 24 hours and then reassessed)    Patient Admission Diagnosis      Allergies  No Known Allergies    Minimum Predicted Vital Capacity:     N/A          Actual Vital Capacity:      N/A              Pulmonary History:COPD and Asthma  Home Oxygen Therapy:  room air  Home Respiratory Therapy:Albuterol and Beclomethasone   Current Respiratory Therapy:  Albuterol mdi PRN, Flovent BID          Respiratory Severity Index(RSI)   Patients with orders for inhalation medications, oxygen, or any therapeutic treatment modality will be placed on Respiratory Protocol. They will be assessed with the first treatment and at least every 72 hours thereafter. The following severity scale will be used to determine frequency of treatment intervention. Smoking History: Pulmonary Disease or Smoking History, Greater than 15 pack year = 2    Social History  Social History     Tobacco Use    Smoking status: Current Every Day Smoker     Packs/day: 1.00     Years: 29.00     Pack years: 29.00     Types: Cigarettes    Smokeless tobacco: Never Used   Vaping Use    Vaping Use: Never used   Substance Use Topics    Alcohol use: Yes     Comment: occasionally    Drug use: Yes     Types: Marijuana       Recent Surgical History: None = 0  Past Surgical History  History reviewed. No pertinent surgical history.     Level of Consciousness: Alert, Oriented, and Cooperative = 0    Level of Activity: Walking unassisted = 0    Respiratory Pattern: Regular Pattern; RR 8-20 = 0    Breath Sounds: Diminshed bilaterally and/or crackles = 2    Sputum   ,  ,    Cough: Strong, spontaneous, non-productive = 0    Vital Signs   LMP Dose Inhaler (MDI), HHN, Aerogen to intubated patients on mechanical ventilation. 6. Inhalation medication orders will be delivered and/or substituted as outlined below. Aerosolized Medications Ordering and Administration Guidelines:    1. All Medications will be ordered by a physician, and their frequency and/or modality will be adjusted as defined by the patients Respiratory Severity Index (RSI) score. 2. If the patient does not have documented COPD, consider discontinuing anticholinergics when RSI is less than 9.  3. If the bronchospasm worsens (increased RSI), then the bronchodilator frequency can be increased to a maximum of every 4 hours. If greater than every 4 hours is required, the physician will be contacted. 4. If the bronchospasm improves, the frequency of the bronchodilator can be decreased, based on the patient's RSI, but not less than home treatment regimen frequency. 5. Bronchodilator(s) will be discontinued if patient has a RSI less than 9 and has received no scheduled or as needed treatment for 72  Hrs. Patients Ordered on a Mucolytic Agent:    1. Must always be administered with a bronchodilator. 2. Discontinue if patient experiences worsened bronchospasm, or secretions have lessened to the point that the patient is able to clear them with a cough. Anti-inflammatory and Combination Medications:    1. If the patient lacks prior history of lung disease, is not using inhaled anti-inflammatory medication at home, and lacks wheezing by examination or by history for at least 24 hours, contact physician for possible discontinuation.

## 2021-05-24 NOTE — PROGRESS NOTES
Last 2mL of air removed from TR band. Site cleansed with chloraprep and transparent occlusive dressing applied. Site remains WNL; no swelling, bleeding, drainage or hematoma. Site soft, pulse +2.   Kumar Zamora RN  5/24/2021

## 2021-05-24 NOTE — H&P
Hospital Medicine History & Physical      PCP: KIKA Mclaughlin - CNP    Date of Admission: 5/23/2021    Date of Service: Pt seen/examined on 5/23/2021 and Admitted to Inpatient with expected LOS greater than two midnights due to medical therapy. Chief Complaint: NSTEMI, transfer from [facility]      History Of Present Illness:   41 y.o. female who presented to [facility] with above complaints  Patient with PMH of COPD/asthma, tobacco abuse, obesity presented to the ED today at [facility] for chest pain. Patient reports chest pain has been going on for the last couple of weeks. Has been intermittently worse. Today it occurred at around 10 AM, retrosternal in location, sometimes sharp sometimes pressure-like sensation, radiating to right axilla, no associated shortness of breath, nausea vomiting or diaphoresis. She presented to ED on 5/14 for similar complaints, work-up was negative so she was discharged home. The patient had recurrence of symptoms after that and has been bothering her almost every day. Denies any palpitations lightheadedness or syncope episodes. Does report significant family history, brother had heart attack in his 35s with requiring stents. She also reports she is under a lot of stress because of her daughter. Past Medical History:          Diagnosis Date    Asthma     COPD (chronic obstructive pulmonary disease) ([facility] [address]     Diabetes mellitus ([facility]     Hyperlipidemia     Hypertension     Obesity     Scoliosis        Past Surgical History:      History reviewed. No pertinent surgical history. Medications Prior to Admission:      Prior to Admission medications    Medication Sig Start Date End Date Taking?  Authorizing Provider   aspirin 325 MG tablet Take 325 mg by mouth daily    Historical Provider, MD   ibuprofen (ADVIL;MOTRIN) 200 MG tablet Take 200 mg by mouth every 6 hours as needed for Pain    Historical Provider, MD   naproxen (NAPROSYN) 500 MG tablet Take 500 mg by mouth 2 times daily (with meals)    Historical Provider, MD   metFORMIN (GLUCOPHAGE) 1000 MG tablet Take 1 tablet by mouth 2 times daily (with meals) 5/14/21   Ashtyn Garcia MD   albuterol sulfate  (90 Base) MCG/ACT inhaler Inhale 2 puffs into the lungs every 6 hours as needed for Wheezing    Historical Provider, MD   beclomethasone (QVAR) 80 MCG/ACT inhaler Inhale 1 puff into the lungs 2 times daily    Historical Provider, MD       Allergies:  Patient has no known allergies. Social History:      The patient currently lives at home    TOBACCO:   reports that she has been smoking cigarettes. She has a 29.00 pack-year smoking history. She has never used smokeless tobacco.  ETOH:   reports current alcohol use. E-Cigarettes/Vaping Use     Questions Responses    E-Cigarette/Vaping Use Never User    Start Date     Passive Exposure     Quit Date     Counseling Given     Comments             Family History:  Positive as follows:        Problem Relation Age of Onset    Heart Disease Mother     Stroke Sister        REVIEW OF SYSTEMS COMPLETED:   Pertinent positives as noted in the HPI. All other systems reviewed and negative. PHYSICAL EXAM PERFORMED:    Ht 5' 8\" (1.727 m)   Wt 268 lb 8 oz (121.8 kg)   LMP 04/30/2021   BMI 40.83 kg/m²     General appearance:  No apparent distress, appears stated age and cooperative. HEENT:  Normal cephalic, atraumatic without obvious deformity. Pupils equal, round, and reactive to light. Extra ocular muscles intact. Conjunctivae/corneas clear. Neck: Supple, with full range of motion. No jugular venous distention. Trachea midline. Respiratory:  Normal respiratory effort. Clear to auscultation, bilaterally without Rales/Wheezes/Rhonchi. Cardiovascular:  Regular rate and rhythm with normal S1/S2 without murmurs, rubs or gallops. Abdomen: Soft, non-tender, non-distended with normal bowel sounds.   Musculoskeletal:  No clubbing, cyanosis or edema bilaterally. Full range of motion without deformity. Skin: Skin color, texture, turgor normal.  No rashes or lesions. Neurologic:  Neurovascularly intact without any focal sensory/motor deficits. Cranial nerves: II-XII intact, grossly non-focal.  Psychiatric:  Alert and oriented, thought content appropriate, normal insight  Capillary Refill: Brisk,3 seconds, normal  Peripheral Pulses: +2 palpable, equal bilaterally       Labs:     Recent Labs     05/23/21  1230   WBC 11.8*   HGB 15.8   HCT 47.1        Recent Labs     05/23/21  1230      K 3.7      CO2 25   BUN 7   CREATININE 0.6   CALCIUM 10.0     Recent Labs     05/23/21  1230   AST 16   ALT 13   BILITOT 0.3   ALKPHOS 109     No results for input(s): INR in the last 72 hours. Recent Labs     05/23/21  1230 05/23/21  1604 05/23/21  2131   TROPONINI <0.01 0.10* 0.24*       Urinalysis:    No results found for: Dax Centeno, [address], BACTERIA, RBCUA, BLOODU, [address], GLUCOSEU    Radiology:     CXR: I have reviewed the CXR with the following interpretation: No acute process  EKG:  I have reviewed the EKG with the following interpretation: NSR, no acute ischemic changes, no new changes compared to prior EKG from 5/14    No orders to display       ASSESSMENT:PLAN:    NSTEMI (non-ST elevated myocardial infarction)  Typical chest pain with elevated troponin, EKG showed no acute ischemic changes  Cardiology consulted  Follow serial cardiac enzymes, EKG as needed   IV heparin gtt  Continue aspirin daily  High intensity statin  Echo in a.m.  N.p.o. after midnight for cath in a.m. Check A1c and lipid panel    New onset DM 2  Recently diagnosed in the er  A1c pending  Was prescribed Metformin, hold  Placed on sliding scale insulin, Accu-Cheks, hypoglycemia protocol    Hypertension  Patient reports she does not take any medications for this.   Monitor BP and will introduce preferably a beta-blocker if she becomes hypertensive    Tobacco abuse-counseled cessation    Morbid obesity BMI 53.43  Complicating assessment and treatment, placing patient at high risk for multiple co-morbidities as well as early death and contributing to the patient's presentation. Counseled on weight loss. DVT Prophylaxis: On heparin drip  Diet: Diet NPO, After Midnight  DIET CLEAR LIQUID; No Caffeine  Code Status: Full Code    PT/OT Eval Status: Ambulatory    Dispo -IP stay       Hines Bosworth, MD    Thank you KIKA Sanchez CNP for the opportunity to be involved in this patient's care. If you have any questions or concerns please feel free to contact me at [phone].

## 2021-05-24 NOTE — PRE SEDATION
Edwardo Villasenor MD        dextrose 50 % IV solution  12.5 g Intravenous PRN Edwardo Villasenor MD        glucagon (rDNA) injection 1 mg  1 mg Intramuscular PRN Edwardo Villasenor MD        dextrose 5 % solution  100 mL/hr Intravenous PRN Edwardo Villasenor MD        insulin lispro (HUMALOG) injection vial 0-6 Units  0-6 Units Subcutaneous TID WC Edwardo Villasenor MD        insulin lispro (HUMALOG) injection vial 0-3 Units  0-3 Units Subcutaneous Nightly Edwardo Villasenor MD        sodium chloride flush 0.9 % injection 5-40 mL  5-40 mL Intravenous 2 times per day Edwardo Villasenor MD        sodium chloride flush 0.9 % injection 5-40 mL  5-40 mL Intravenous PRN Edwardo Villasenor MD        0.9 % sodium chloride infusion  25 mL Intravenous PRN Edwardo Villasenor MD        promethazine (PHENERGAN) tablet 12.5 mg  12.5 mg Oral Q6H PRN Edwardo Villasenor MD        Or    ondansetron (ZOFRAN) injection 4 mg  4 mg Intravenous Q6H PRN Edwardo Villasenor MD        acetaminophen (TYLENOL) tablet 650 mg  650 mg Oral Q6H PRN Edwardo Villasenor MD   650 mg at 05/23/21 2217    Or    acetaminophen (TYLENOL) suppository 650 mg  650 mg Rectal Q6H PRN Edwardo Villasenor MD        polyethylene glycol (GLYCOLAX) packet 17 g  17 g Oral Daily PRN Edwardo Villasenor MD        aspirin chewable tablet 81 mg  81 mg Oral Daily Edwardo Villasenor MD   81 mg at 05/24/21 0845    atorvastatin (LIPITOR) tablet 80 mg  80 mg Oral Nightly Edwardo Villasenor MD   80 mg at 05/23/21 2218    perflutren lipid microspheres (DEFINITY) injection 1.65 mg  1.5 mL Intravenous ONCE PRN Edwardo Villasenor MD        heparin (porcine) injection 4,000 Units  4,000 Units Intravenous PRN Edwardo Villasenor MD        heparin (porcine) injection 2,000 Units  2,000 Units Intravenous PRN Edwardo Villasenor MD   2,000 Units at 05/24/21 0125    heparin 25,000 units in dextrose 5% 250 mL (premix) infusion  13.4 mL/hr Intravenous Continuous Koby Torre MD   Stopped at 05/24/21 0851    nitroGLYCERIN (NITROSTAT) SL tablet 0.4 mg  0.4 mg Sublingual Q5 Min PRN Koby Torre MD   0.4 mg at 05/23/21 2240    morphine (PF) injection 2 mg  2 mg Intravenous Q4H PRN Koby Torre MD   2 mg at 05/23/21 2319           Pre-Sedation:    Pre-Sedation Documentation and Exam:  I have personally completed a history, physical exam & review of systems for this patient (see notes). Prior History of Anesthesia Complications:   none    Modified Mallampati:  III (soft palate, base of uvula visible)    ASA Classification:  Class 3 - A patient with severe systemic disease that limits activity but is not incapacitating      Luzmaria Scale: Activity:  2 - Able to move 4 extremities voluntarily on command  Respiration:  2 - Able to breathe deeply and cough freely  Circulation:  2 - BP+/- 20mmHg of normal  Consciousness:  2 - Fully awake  Oxygen Saturation (color):  2 - Able to maintain oxygen saturation >92% on room air    Sedation/Anesthesia Plan:  Guard the patient's safety and welfare. Minimize physical discomfort and pain. Minimize negative psychological responses to treatment by providing sedation and analgesia and maximize the potential amnesia. Patient to meet pre-procedure discharge plan.     Medication Planned:  midazolam intravenously and fentanyl intravenously    Patient is an appropriate candidate for plan of sedation: yes      Electronically signed by Alin Chang DO on 5/24/2021 at 9:17 AM

## 2021-05-24 NOTE — PROGRESS NOTES
Pt back to floor from cath lab. BP (!) 151/94   Pulse 72   Temp 97.7 °F (36.5 °C) (Oral)   Resp 18   Ht 5' 8\" (1.727 m)   Wt 268 lb 8 oz (121.8 kg)   LMP 04/30/2021   SpO2 98%   BMI 40.83 kg/m²  on room air. R radial site with TR band in place with 14mL of air per cath lab RN, to begin removing air at 10:15. Arm board in place. Site WNL; no swelling, bleeding, drainage or hematoma. Pulse +2, pt with full sensation to extremity. Pt instructed to importance of keep involved extremity immobilized and of bedrest for 2 hours. Pt denies any needs at this time. Bedside table and call light within reach. Will continue to monitor.   Antwon Spencer RN  5/24/2021

## 2021-05-24 NOTE — PROGRESS NOTES
Hospitalist Progress Note      PCP: Jose Luis Denis, APRN - CNP    Date of Admission: 5/23/2021    Chief Complaint: chest pain    Hospital Course:   Patient with PMH of COPD/asthma, tobacco abuse, obesity presented to the ED today at [facility] for chest pain. Patient reports chest pain has been going on for the last couple of weeks. Has been intermittently worse. Today it occurred at around 10 AM, retrosternal in location, sometimes sharp sometimes pressure-like sensation, radiating to right axilla, no associated shortness of breath, nausea vomiting or diaphoresis. She presented to ED on 5/14 for similar complaints, work-up was negative so she was discharged home. The patient had recurrence of symptoms after that and has been bothering her almost every day. Denies any palpitations lightheadedness or syncope episodes. Does report significant family history, brother had heart attack in his 35s with requiring stents. She also reports she is under a lot of stress because of her daughter. Subjective: chest pain resolved. Tolerated LHC well. No new complaints.        Medications:  Reviewed    Infusion Medications    dextrose      sodium chloride       Scheduled Medications    metoprolol succinate  25 mg Oral Daily    lisinopril  2.5 mg Oral Daily    ticagrelor  90 mg Oral BID    fluticasone  2 puff Inhalation BID    insulin lispro  0-6 Units Subcutaneous TID WC    insulin lispro  0-3 Units Subcutaneous Nightly    sodium chloride flush  5-40 mL Intravenous 2 times per day    aspirin  81 mg Oral Daily    atorvastatin  80 mg Oral Nightly     PRN Meds: acetaminophen, albuterol sulfate HFA, glucose, dextrose, glucagon (rDNA), dextrose, sodium chloride flush, sodium chloride, promethazine **OR** ondansetron, [DISCONTINUED] acetaminophen **OR** acetaminophen, polyethylene glycol, perflutren lipid microspheres, nitroGLYCERIN, morphine      Intake/Output Summary (Last 24 hours) at 5/24/2021 Assessment/Plan:    Active Hospital Problems    Diagnosis     [facility] [I21.4]     DM (diabetes mellitus), type 2 [facility] [E11.9]     Chest pain [R07.9]     Tobacco abuse [Z72.0]     HTN (hypertension) [I10]      NSTEMI  - cardiology consulted  - s/p LHC with severe small vessel disease  - off heparin gtt  - started on ASA, brilinta, statin, metoprolol    Acute systolic heart failure  - cardiology consulted  - EF 40% during LHC  - echo ordered  - started on metoprolol, lisinopril. No need for diuretics    DMII  - newly diagnosed  - SSI ordered  - will start on metformin on discharge. Will need close follow up with PCP    HTN  - well controlled  - started on metoprolol, lisinopril    Tobacco dependence  - counseling given    Morbid Obesity  With Body mass index is 54.50 kg/m². Complicating assessment and treatment. Placing patient at risk for multiple co-morbidities as well as early death and contributing to the patient's presentation. Counseled on weight loss.      DVT Prophylaxis: heparin  Diet: DIET CARDIAC; No Caffeine  Code Status: Full Code    PT/OT Eval Status: not ordered    Dispo - likely home tomorrow    Bud Berrios MD

## 2021-05-24 NOTE — PROGRESS NOTES
Admitted from outside hospital to [facility]. A&O X4, VSS. Heparin infusing at 1000 units/hr. C/O continuing mild pain in right shoulder radiating to chest; states that, since heparin was started, \"it's relieved it a lot. \" Med education provided re: heparin as a blood thinner and importance of avoiding falls. Pt states she will call for ambulation assistance. Perfect Serve sent to MD to notify of pt arrival and request admission orders (see below). \"5/23/21 8:58 PM   [phone] Hospital or Facility: [facility] From: Ralph Lovelace RE: Alexandra Emerson 1980 RM: [id number] NSTEMI. Pt has arrived from [facility] and needs admission orders. FYI heparin gtt at 1000 units/hr running. Thanks!  Need Callback: NO CALLBACK REQ C4 PROGRESSIVE CARE NEW ADMISSION ROUTINE  Unread\"

## 2021-05-24 NOTE — PROGRESS NOTES
Pt complains of \"8\" chest pain and being hot. /85   Pulse 77   Temp 97.7 °F (36.5 °C) (Oral)   Resp 16   Ht 5' 8\" (1.727 m)   Wt 268 lb 8 oz (121.8 kg)   LMP 04/30/2021   SpO2 98%   BMI 40.83 kg/m²  on room air. Pt  Placed on 2L O2, prn morphine given. Writer will obtain EKG and notify MD regarding.   Cristopher Ojeda RN  5/24/2021

## 2021-05-24 NOTE — PROCEDURES
CARDIAC CATHETERIZATION REPORT    Date of Procedure: 5/24/2021  : Kristine Chang DO  Primary Indication: NSTEMI    Procedures Performed:  1. Coronary angiography  2. Left heart catheterization  3. Left ventriculography  4. Moderate conscious sedation    Procedural Details:  1. Access: Local anesthetic was given and access was obtained in the right radial artery using a micropuncture technique and a 6F Terumo Slender Sheath was placed without difficulty. 2. Diagnostic: A 5F TIG catheter was used to perform selective right and left coronary angiography. The 5F TIG catheter was also used to perform the left heart catheterization. No significant gradient was observed on pull-back of the catheter across the aortic valve. A 5F pigtail catheter was used to perform left ventriculography. 3. Hemostasis: At the end of the procedure, the radial sheath was removed and a hemoband was placed over the arteriotomy site and filled with air to maintain hemostasis. Findings:  1. Hemodynamics:     A. Opening arterial pressure: 131/85 (115) mmHg      B. LVEDP: 16 mmHg    2. Coronary anatomy:   A. Left main artery: The left main artery bifurcates into the left anterior descending artery and left circumflex artery. The left main artery has minor luminal irregularities. B. Left anterior descending artery: Transapical vessel which gives rise to 2 diagonal arteries. The LAD has 20% ostial, 40% proxima, and 40% mid-vessel stenoses. 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 The diagonal arteries have minor luminal irregularities. C. Left circumflex artery: Non-dominant vessel that gives rise to 3 obtuse marginal arteries. The LCx has minor luminal irregularities. The OM1 is a relatively small caliber vessel with a high origin and has a 60-70% ostial stenosis.   The OM2 is a large vessel that courses to the apex and has

## 2021-05-24 NOTE — PROGRESS NOTES
Pt's chest pain currently \"4\" after prn morphine given. EKG completed. Pt with chest/right arm pain \"4\", writer sent message to Dr. Chastity Baez regarding. Vitals stable. Chuy Steve RN  5/24/2021    Writer spoke with Milton Brittle, awaiting call from Dr. Camilla Guerra. Chuy Steve RN  5/24/2021    1354 writer received call from Dr. Camilla Guerra, made aware, to give SL nitro at this time, and will await orders for imdur.   Chuy Steve RN  5/24/2021

## 2021-05-24 NOTE — PROGRESS NOTES
/82   Pulse 84   Temp 98 °F (36.7 °C) (Oral)   Resp 20   Ht 5' 8\" (1.727 m)   Wt 268 lb 8 oz (121.8 kg)   LMP 04/30/2021   SpO2 98%   BMI 40.83 kg/m²  on room air. Pt tearful this a.m. Pt denies pain, discomfort or shortness of breath at this time. Lungs diminished. Heparin drip infusing at 13.4mL/hr as ordered, 2,000 unit heparin bolus was given as ordered. Cath lab at bedside. Consent being signed at this time. Pt in gown only. Pt going off floor to cath lab at this time, CMU made aware.   Miguel Ángel Fine, RN  5/24/2021

## 2021-05-25 VITALS
DIASTOLIC BLOOD PRESSURE: 76 MMHG | WEIGHT: 268.2 LBS | HEART RATE: 65 BPM | HEIGHT: 68 IN | BODY MASS INDEX: 40.65 KG/M2 | SYSTOLIC BLOOD PRESSURE: 120 MMHG | OXYGEN SATURATION: 97 % | TEMPERATURE: 98.4 F | RESPIRATION RATE: 18 BRPM

## 2021-05-25 LAB
EKG ATRIAL RATE: 85 BPM
EKG DIAGNOSIS: NORMAL
EKG P AXIS: 36 DEGREES
EKG P-R INTERVAL: 198 MS
EKG Q-T INTERVAL: 392 MS
EKG QRS DURATION: 90 MS
EKG QTC CALCULATION (BAZETT): 466 MS
EKG R AXIS: 15 DEGREES
EKG T AXIS: 51 DEGREES
EKG VENTRICULAR RATE: 85 BPM
GLUCOSE BLD-MCNC: 179 MG/DL (ref 70–99)
GLUCOSE BLD-MCNC: 183 MG/DL (ref 70–99)
GLUCOSE BLD-MCNC: 294 MG/DL (ref 70–99)
HCT VFR BLD CALC: 39.9 % (ref 36–48)
HEMOGLOBIN: 13.8 G/DL (ref 12–16)
LV EF: 53 %
LVEF MODALITY: NORMAL
MCH RBC QN AUTO: 31.1 PG (ref 26–34)
MCHC RBC AUTO-ENTMCNC: 34.7 G/DL (ref 31–36)
MCV RBC AUTO: 89.6 FL (ref 80–100)
PDW BLD-RTO: 13.8 % (ref 12.4–15.4)
PERFORMED ON: ABNORMAL
PLATELET # BLD: 168 K/UL (ref 135–450)
PMV BLD AUTO: 9.3 FL (ref 5–10.5)
RBC # BLD: 4.46 M/UL (ref 4–5.2)
WBC # BLD: 10.6 K/UL (ref 4–11)

## 2021-05-25 PROCEDURE — 6370000000 HC RX 637 (ALT 250 FOR IP): Performed by: INTERNAL MEDICINE

## 2021-05-25 PROCEDURE — 36415 COLL VENOUS BLD VENIPUNCTURE: CPT

## 2021-05-25 PROCEDURE — 85027 COMPLETE CBC AUTOMATED: CPT

## 2021-05-25 PROCEDURE — 93010 ELECTROCARDIOGRAM REPORT: CPT | Performed by: INTERNAL MEDICINE

## 2021-05-25 PROCEDURE — 93005 ELECTROCARDIOGRAM TRACING: CPT | Performed by: INTERNAL MEDICINE

## 2021-05-25 PROCEDURE — 99233 SBSQ HOSP IP/OBS HIGH 50: CPT | Performed by: INTERNAL MEDICINE

## 2021-05-25 PROCEDURE — 6360000002 HC RX W HCPCS: Performed by: INTERNAL MEDICINE

## 2021-05-25 PROCEDURE — 93306 TTE W/DOPPLER COMPLETE: CPT

## 2021-05-25 PROCEDURE — 94640 AIRWAY INHALATION TREATMENT: CPT

## 2021-05-25 RX ORDER — METOPROLOL SUCCINATE 25 MG/1
25 TABLET, EXTENDED RELEASE ORAL DAILY
Qty: 30 TABLET | Refills: 0 | Status: SHIPPED | OUTPATIENT
Start: 2021-05-26 | End: 2021-06-09 | Stop reason: SDUPTHER

## 2021-05-25 RX ORDER — FAMOTIDINE 20 MG/1
20 TABLET, FILM COATED ORAL ONCE
Status: COMPLETED | OUTPATIENT
Start: 2021-05-25 | End: 2021-05-25

## 2021-05-25 RX ORDER — ATORVASTATIN CALCIUM 80 MG/1
80 TABLET, FILM COATED ORAL NIGHTLY
Qty: 30 TABLET | Refills: 0 | Status: SHIPPED | OUTPATIENT
Start: 2021-05-25

## 2021-05-25 RX ORDER — LISINOPRIL 2.5 MG/1
2.5 TABLET ORAL DAILY
Qty: 30 TABLET | Refills: 0 | Status: SHIPPED | OUTPATIENT
Start: 2021-05-26

## 2021-05-25 RX ORDER — ASPIRIN 81 MG/1
81 TABLET, CHEWABLE ORAL DAILY
Qty: 30 TABLET | Refills: 0 | Status: SHIPPED | OUTPATIENT
Start: 2021-05-26

## 2021-05-25 RX ORDER — ISOSORBIDE MONONITRATE 30 MG/1
30 TABLET, EXTENDED RELEASE ORAL DAILY
Qty: 30 TABLET | Refills: 0 | Status: SHIPPED | OUTPATIENT
Start: 2021-05-26

## 2021-05-25 RX ADMIN — FAMOTIDINE 20 MG: 20 TABLET ORAL at 09:18

## 2021-05-25 RX ADMIN — NITROGLYCERIN 0.4 MG: 0.4 TABLET, ORALLY DISINTEGRATING SUBLINGUAL at 08:59

## 2021-05-25 RX ADMIN — NITROGLYCERIN 0.4 MG: 0.4 TABLET, ORALLY DISINTEGRATING SUBLINGUAL at 09:06

## 2021-05-25 RX ADMIN — ONDANSETRON 4 MG: 2 INJECTION INTRAMUSCULAR; INTRAVENOUS at 08:59

## 2021-05-25 RX ADMIN — ACETAMINOPHEN 650 MG: 325 TABLET ORAL at 13:03

## 2021-05-25 RX ADMIN — LISINOPRIL 2.5 MG: 2.5 TABLET ORAL at 09:18

## 2021-05-25 RX ADMIN — ACETAMINOPHEN 650 MG: 325 TABLET ORAL at 07:18

## 2021-05-25 RX ADMIN — TICAGRELOR 90 MG: 90 TABLET ORAL at 09:18

## 2021-05-25 RX ADMIN — INSULIN LISPRO 3 UNITS: 100 INJECTION, SOLUTION INTRAVENOUS; SUBCUTANEOUS at 13:03

## 2021-05-25 RX ADMIN — METOPROLOL SUCCINATE 25 MG: 25 TABLET, EXTENDED RELEASE ORAL at 09:17

## 2021-05-25 RX ADMIN — Medication 2 PUFF: at 08:20

## 2021-05-25 RX ADMIN — ASPIRIN 81 MG: 81 TABLET, CHEWABLE ORAL at 09:18

## 2021-05-25 RX ADMIN — ISOSORBIDE MONONITRATE 30 MG: 30 TABLET, EXTENDED RELEASE ORAL at 09:18

## 2021-05-25 RX ADMIN — INSULIN LISPRO 1 UNITS: 100 INJECTION, SOLUTION INTRAVENOUS; SUBCUTANEOUS at 09:19

## 2021-05-25 ASSESSMENT — PAIN SCALES - GENERAL
PAINLEVEL_OUTOF10: 8
PAINLEVEL_OUTOF10: 8
PAINLEVEL_OUTOF10: 3

## 2021-05-25 ASSESSMENT — PAIN DESCRIPTION - PROGRESSION: CLINICAL_PROGRESSION: GRADUALLY IMPROVING

## 2021-05-25 ASSESSMENT — PAIN DESCRIPTION - PAIN TYPE: TYPE: ACUTE PAIN

## 2021-05-25 NOTE — CARE COORDINATION
CASE MANAGEMENT INITIAL ASSESSMENT      Reviewed chart and completed assessment with: patient and    Explained Case Management role/services. Health Care Decision Maker :   Primary Decision Maker: Maria Elena Arnold Spouse - [phone]    Secondary Decision Maker: Amanda Painting - Child - [phone]          Can this person be reached and be able to respond quickly, such as within a few minutes or hours? Yes    Admit date/status: 5/23/21 Inpatient   Diagnosis: NSTEMI    Is this a Readmission?:  Yes      Insurance: none   Precert required for SNF: n/a   3 night stay required: n/a     Living arrangements, Adls, care needs, prior to admission: lives in a house with her  and kids    Transportation: patient      Durable Medical Equipment at home:  Walker__Cane__RTS__ BSC__Shower Chair__  02__ HHN_X_ CPAP__  BiPap__  Hospital Bed__ W/C___ Other__________    Services in the home and/or outpatient, prior to admission: none    Dialysis Facility (if applicable)   · Name:  · Address:  · Dialysis Schedule:  · Phone:  · Fax:    PT/OT recs: none    Hospital Exemption Notification (HEN): not initiated     Barriers to discharge: none    Plan/comments: Patient is independent at home. Discussed that patient has no insurance and she confirmed this. Message sent to Cory Dolan in financial to see if patient is eligible for Medicaid. Likely no needs from CM. Please notify CM should any needs arise.

## 2021-05-25 NOTE — DISCHARGE SUMMARY
Hospital Medicine Discharge Summary    Patient ID: Dolph Click      Patient's PCP: KIKA Knott - CNP    Admit Date: 5/23/2021     Discharge Date:   05/25/21    Admitting Physician: Jodi Kohli MD     Discharge Physician: Quiana Nicole MD     Discharge Diagnoses: Active Hospital Problems    Diagnosis     NSTEMI (non-ST elevated myocardial infarction) ([facility] [I21.4]     DM (diabetes mellitus), type 2 ([facility] [E11.9]     Chest pain [R07.9]     Tobacco abuse [Z72.0]     HTN (hypertension) [I10]        The patient was seen and examined on day of discharge and this discharge summary is in conjunction with any daily progress note from day of discharge. Hospital Course:   Patient with PMH of COPD/asthma, tobacco abuse, obesity presented to the ED today at [facility] for chest pain.  Patient reports chest pain has been going on for the last couple of weeks.  Has been intermittently worse.  Today it occurred at around 10 AM, retrosternal in location, sometimes sharp sometimes pressure-like sensation, radiating to right axilla, no associated shortness of breath, nausea vomiting or diaphoresis.  She presented to ED on 5/14 for similar complaints, work-up was negative so she was discharged home.  The patient had recurrence of symptoms after that and has been bothering her almost every day.  Denies any palpitations lightheadedness or syncope episodes.  Does report significant family history, brother had heart attack in his 35s with requiring stents.  She also reports she is under a lot of stress because of her daughter. NSTEMI  - cardiology consulted  - s/p C with severe small vessel disease  - off heparin gtt  - started on ASA, brilinta, statin, metoprolol     Acute diastolic heart failure  - cardiology consulted  - echo with EF 50-55%  - started on metoprolol, lisinopril.  No need for diuretics     DMII  - newly diagnosed  - started on metformin  - will need close follow up with PCP     HTN  - well controlled  - started on metoprolol, lisinopril     Tobacco dependence  - counseling given     Morbid Obesity  With Body mass index is 42.84 kg/m². Complicating assessment and treatment. Placing patient at risk for multiple co-morbidities as well as early death and contributing to the patient's presentation. Counseled on weight loss. Physical Exam Performed:     /76   Pulse 65   Temp 98.4 °F (36.9 °C) (Oral)   Resp 18   Ht 5' 8\" (1.727 m)   Wt 268 lb 3.2 oz (121.7 kg)   LMP 04/30/2021   SpO2 97%   BMI 40.78 kg/m²       General appearance: No apparent distress, appears stated age and cooperative. HEENT: Pupils equal, round, and reactive to light. Conjunctivae/corneas clear. Neck: Supple, with full range of motion. No jugular venous distention. Trachea midline. Respiratory:  Normal respiratory effort. Clear to auscultation, bilaterally without Rales/Wheezes/Rhonchi. Cardiovascular: Regular rate and rhythm with normal S1/S2 without murmurs, rubs or gallops. Abdomen: Soft, non-tender, non-distended with normal bowel sounds. Musculoskeletal: No clubbing, cyanosis or edema bilaterally. Full range of motion without deformity. Skin: Skin color, texture, turgor normal.  No rashes or lesions. Neurologic:  Neurovascularly intact without any focal sensory/motor deficits. Cranial nerves: II-XII intact, grossly non-focal.  Psychiatric: Alert and oriented, thought content appropriate, normal insight  Capillary Refill: Brisk,3 seconds, normal   Peripheral Pulses: +2 palpable, equal bilaterally     Labs:  For convenience and continuity at follow-up the following most recent labs are provided:      CBC:    Lab Results   Component Value Date    WBC 10.6 05/25/2021    HGB 13.8 05/25/2021    HCT 39.9 05/25/2021     05/25/2021       Renal:    Lab Results   Component Value Date     05/24/2021    K 3.5 05/24/2021     05/24/2021    CO2 28 05/24/2021    BUN 11 05/24/2021 CREATININE 0.7 05/24/2021    CALCIUM 9.6 05/24/2021         Significant Diagnostic Studies    Radiology:   No orders to display          Consults:     IP CONSULT TO CARDIOLOGY    Disposition:  home     Condition at Discharge: Stable    Discharge Instructions/Follow-up:  Follow up with PCP, cardiology within 1-2 weeks    Code Status:  Full Code     Activity: activity as tolerated    Diet: cardiac diet      Discharge Medications:     Current Discharge Medication List           Details   aspirin 81 MG chewable tablet Take 1 tablet by mouth daily  Qty: 30 tablet, Refills: 0      isosorbide mononitrate (IMDUR) 30 MG extended release tablet Take 1 tablet by mouth daily  Qty: 30 tablet, Refills: 0      atorvastatin (LIPITOR) 80 MG tablet Take 1 tablet by mouth nightly  Qty: 30 tablet, Refills: 0      lisinopril (PRINIVIL;ZESTRIL) 2.5 MG tablet Take 1 tablet by mouth daily  Qty: 30 tablet, Refills: 0      metoprolol succinate (TOPROL XL) 25 MG extended release tablet Take 1 tablet by mouth daily  Qty: 30 tablet, Refills: 0      ticagrelor (BRILINTA) 90 MG TABS tablet Take 1 tablet by mouth 2 times daily  Qty: 60 tablet, Refills: 0              Details   ibuprofen (ADVIL;MOTRIN) 200 MG tablet Take 200 mg by mouth every 6 hours as needed for Pain      naproxen (NAPROSYN) 500 MG tablet Take 500 mg by mouth 2 times daily (with meals)      metFORMIN (GLUCOPHAGE) 1000 MG tablet Take 1 tablet by mouth 2 times daily (with meals)  Qty: 60 tablet, Refills: 5      albuterol sulfate  (90 Base) MCG/ACT inhaler Inhale 2 puffs into the lungs every 6 hours as needed for Wheezing      beclomethasone (QVAR) 80 MCG/ACT inhaler Inhale 1 puff into the lungs 2 times daily             Time Spent on discharge is more than 30 minutes in the examination, evaluation, counseling and review of medications and discharge plan.       Signed:    Vicky Espinoza MD   5/25/2021      Thank you KIKA Mclaughlin - CUATE for the opportunity to be involved in this patient's care. If you have any questions or concerns please feel free to contact me at [phone].

## 2021-05-25 NOTE — PROGRESS NOTES
Dr Barbara Padilla at bedside, EKG completed and reviewed by Cardiology. Patient states she is pain free at this time (10:50 am).

## 2021-05-25 NOTE — PROGRESS NOTES
Patient complaining of chest tightness after eating, does not radiate, patient states this is what happens after she eats. Cardiology notified, awaiting call back from Dr John Day.

## 2021-05-25 NOTE — PROGRESS NOTES
Patient discharged home, IV removed, tele box returned [address] aware. Picked up by . Patient verbalized understanding of dc and follow up instructions. Meds to bed utilized. Patient escorted to car by staff.

## 2021-05-25 NOTE — PROGRESS NOTES
[address]   PROGRESS NOTE  [phone]      Attending Physician: Gutierrez Hicks MD  Reason for Consultation/Chief Complaint: NSTEMI    Subjective     Underwent invasive coronary angiography yesterday demonstrating severe small branch vessel coronary artery disease that was not amenable to percutaneous coronary intervention. Remained stable overnight, but had episode of atypical chest pain this morning associated with nausea and belching after eating a large breakfast.  Symptoms resolved after receiving Pepcid. Repeat ECG was without ischemic changes. CURRENT Medications:  metoprolol succinate (TOPROL XL) extended release tablet 25 mg, Daily  lisinopril (PRINIVIL;ZESTRIL) tablet 2.5 mg, Daily  ticagrelor (BRILINTA) tablet 90 mg, BID  acetaminophen (TYLENOL) tablet 650 mg, Q4H PRN  isosorbide mononitrate (IMDUR) extended release tablet 30 mg, Daily  albuterol sulfate  (90 Base) MCG/ACT inhaler 2 puff, Q6H PRN  fluticasone (FLOVENT HFA) 110 MCG/ACT inhaler 2 puff, BID  glucose (GLUTOSE) 40 % oral gel 15 g, PRN  dextrose 50 % IV solution, PRN  glucagon (rDNA) injection 1 mg, PRN  dextrose 5 % solution, PRN  insulin lispro (HUMALOG) injection vial 0-6 Units, TID WC  insulin lispro (HUMALOG) injection vial 0-3 Units, Nightly  sodium chloride flush 0.9 % injection 5-40 mL, 2 times per day  sodium chloride flush 0.9 % injection 5-40 mL, PRN  0.9 % sodium chloride infusion, PRN  promethazine (PHENERGAN) tablet 12.5 mg, Q6H PRN   Or  ondansetron (ZOFRAN) injection 4 mg, Q6H PRN  acetaminophen (TYLENOL) suppository 650 mg, Q6H PRN  polyethylene glycol (GLYCOLAX) packet 17 g, Daily PRN  aspirin chewable tablet 81 mg, Daily  atorvastatin (LIPITOR) tablet 80 mg, Nightly  perflutren lipid microspheres (DEFINITY) injection 1.65 mg, ONCE PRN  nitroGLYCERIN (NITROSTAT) SL tablet 0.4 mg, Q5 Min PRN  morphine (PF) injection 2 mg, Q4H PRN        Allergies:  Patient has no known allergies.      Review of Systems:   General: No chills or sweats. Cardiac: Positive for chest pain as above. No palpitations. Respiratory: No cough or shortness of breath. GI: Positive for nausea. No vomiting or diarrhea. Neuro: No lightheadedness or dizziness. Objective   PHYSICAL EXAM:    Vitals:    05/25/21 0915   BP: 136/75   Pulse: 75   Resp:    Temp:    SpO2:     Weight: 268 lb 3.2 oz (121.7 kg)      General: Morbidly obese adult female lying in bed. HEENT: Normocephalic, atraumatic, non-icteric, hearing intact, nares normal, mucous membranes moist.  Neck: No JVD. Heart: Regular rate and rhythm. Normal S1 and S2. No murmurs, gallops or rubs. Lungs: Normal respiratory effort. Clear to auscultation bilaterally. No wheezes, rales, or rhonchi. Abdomen: Soft, non-tender. Normoactive bowel sounds. No masses or organomegaly. Skin: No rashes, wounds, or lesions. Pulses: 2+ and symmetric. Extremities: Right radial artery access site is soft, non-tender, and without evidence of hematoma. Right radial artery pulse is 2+. No clubbing, cyanosis, or edema. Psych: Normal mood and affect. Neuro: Alert and oriented to person, place, and time. No focal deficits noted.       Labs   CBC:   Lab Results   Component Value Date    WBC 10.6 05/25/2021    RBC 4.46 05/25/2021    HGB 13.8 05/25/2021    HCT 39.9 05/25/2021    MCV 89.6 05/25/2021    RDW 13.8 05/25/2021     05/25/2021     CMP:  Lab Results   Component Value Date     05/24/2021    K 3.5 05/24/2021     05/24/2021    CO2 28 05/24/2021    BUN 11 05/24/2021    CREATININE 0.7 05/24/2021    GFRAA >60 05/24/2021    AGRATIO 1.4 05/23/2021    LABGLOM >60 05/24/2021    GLUCOSE 193 05/24/2021    PROT 6.7 05/23/2021    CALCIUM 9.6 05/24/2021    BILITOT 0.3 05/23/2021    ALKPHOS 109 05/23/2021    AST 16 05/23/2021    ALT 13 05/23/2021     PT/INR:  No results found for: PTINR  HgBA1c:  Lab Results   Component Value Date    LABA1C 11.3 05/24/2021     Lab Results   Component Value Date    TROPONINI 0.06 (H) 05/24/2021         Cardiac Data     [facility] 5/25/21:  Findings:   1. Hemodynamics:      A. Opening arterial pressure: 131/85 (115) mmHg       B. LVEDP: 16 mmHg     2.  Coronary anatomy:   A. Left main artery: The left main artery bifurcates into the   left anterior descending artery and left circumflex artery.  The   left main artery has minor luminal irregularities. B. Left anterior descending artery: Transapical vessel which   gives rise to 2 diagonal arteries.  The LAD has 20% ostial, 40%   proxima, and 40% mid-vessel stenoses. [place] appears to be a   short myocardial bridge in the distal LAD in a segment of the   vessel that has mild-moderate atherosclerotic disease.  The   diagonal arteries have minor luminal irregularities. C. Left circumflex artery: Non-dominant vessel that gives rise to   3 obtuse marginal arteries.  The LCx has minor luminal   irregularities.  The OM1 is a relatively small caliber vessel   with a high origin and has a 60-70% ostial stenosis.  The OM2 is   a large vessel that courses to the apex and has minor luminal   irregularities.  The OM3 is a medium caliber vessel with minor   luminal irregularities. D. Right coronary artery: Large, dominant vessel that gives rise   to the posterior descending artery and 3 posterolateral branches.    The RCA has miinor luminal irregularities up to the bifurcation   of the RPDA.  After the RDPA, the distal RCA tapers to a relative   small caliber vessel and has a 50% stenosis.  The RPDA has a   distal 90% stenosis in a segment of the vessel that measures <1.5   mm in diameter.  The first RPLB is a small vessel with mild   disease.  The second RPLB is a small vessel with a 90% stenosis   in the mid-segment.  The third RPLB is a small-medium caliber   vessel with a distal 95% stenosis in a segment of the vessel that   measures <1.5 mm in diameter. 3. Left ventriculography:   A.  LVEF 40% with mild diffuse hypokinesis and that is slightly   more pronounced inferiorly. B. No significant mitral regurgitation. TTE 5/25/21:  Conclusions   Summary   Left ventricular systolic function is normal with ejection fraction   estimated at 50-55%. There is mild hypokinesis of the inferior wall. There is mild concentric left ventricular hypertrophy. Grade I diastolic dysfunction with normal left ventricular filling pressure. Mild mitral regurgitation. Assessment and Plan      1. CAD/NSTEMI - Underwent invasive coronary angiography yesterday demonstrating severe small branch vessel coronary artery disease that was not amenable to percutaneous coronary intervention. TTE earlier today showed LVEF of 50-55% with mild inferior hypokinesis, mild concentric LVH, grade 1 diastolic dysfunction, normal RV size and systolic function, and mild mitral regurgitation. Patient had episode of atypical chest pain earlier this morning after eating a heavy breakfast that was suspected to be secondary to GERD and resolved after receiving Pepcid. She remains hemodynamically stable and repeat ECG was without ischemic changes.  -Continue aspirin 81 mg daily and Brilinta 90 mg BID - Brilinta should be continued through at least 5/24/21  -Continue atorvastatin 80 mg daily metoprolol succinate 25 mg daily  -Start imdur 30 mg daily to further optimize anti-angina therapy  -OK for discharge from cardiology standpoint and will arrange for follow-up with [facility] in 2 weeks    2. Ischemic cardiomyopathy - LVEF 50-55% on TTE with mild inferior hypokinesis. Currently appears clinically euvolemic.  -Continue metoprolol succinate 25 mg daily and lisinopril 2.5 mg daily  -Will arrange for close follow-up and further titration of medical therapy as above     3. Essential hypertension - BP remains controlled. -Continue metoprolol succinate 25 mg daily, lisinopril 2.5 mg daily, and imdur 30 mg daily     4.  Hyperlipidemia  -LDL 64 on lipid panel  -Continue atorvastatin 80 mg daily     5. Type II DM  -On sliding scale insulin     6. Morbid obesity  -BMI 40.89  -Will benefit from weight loss through dietary modifications and regular physical exercise     7. At risk for sleep apnea  -Recommend formal outpatient sleep study     8. Tobacco use  -Again reviewed risks of continued use and strongly encouraged smoking cessation      Thank you for allowing us to participate in the care of Alayna Bowles. Please call me with any questions [phone]. Devi Mendoza,   [address]  [phone] [facility]  [phone] [address] [place] [address]  5/25/2021 12:24 PM      I will address the patient's cardiac risk factors and adjusted pharmacologic treatment as needed. In addition, I have reinforced the need for patient directed risk factor modification. All questions and concerns were addressed to the patient/family. Alternatives to my treatment were discussed. The note was completed using EMR. Every effort was made to ensure accuracy; however, inadvertent computerized transcription errors may be present.

## 2021-05-27 NOTE — PROGRESS NOTES
Physician Progress Note      Svetlana Razo  [facility] #:                  [id number]  :                       1980  ADMIT DATE:       2021 8:52 PM  [address] DATE:        2021 5:58 PM  RESPONDING  PROVIDER #:        Carlos Schwartz MD          QUERY TEXT:    Patient admitted with NSTEMI. D/C summary notes-Acute diastolic heart failure   - No need for diuretics\". Cardiology PN- - ischemic cardiomyopathy - LVEF   50-55% on TTE with mild inferior hypokinesis. Currently appears clinically   euvolemic  If possible, please document in progress notes and discharge summary the   acuity of CHF that you are evaluating and /or treating,    The medical record reflects the following:  Risk Factors: NSTEMI, ischemic cardiomyopathy, DM, HTN  Clinical Indicators: -Cardiology PN-   echo with EF 50-55% - started on   metoprolol, lisinopril- Currently appears clinically euvolemic. Harrison Jimenez HARRIS-\"Left   ventricular systolic function is normal with ejection fraction  estimated at   50-55%. There is mild hypokinesis of the inferior wall. There is mild   concentric left ventricular hypertrophy. Grade I diastolic dysfunction with   normal left ventricular filling pressure\"  Treatment: Cardiology consult,  coronary angiography,  started on metoprolol,   lisinopril, Continue metoprolol succinate 25 mg daily and lisinopril 2.5 mg   daily -Will arrange for close follow-up and further titration of medical   therapy as above , I&O's, daily weights, HARRIS, supportive care  Options provided:  -- After study, Ischemic cardiomyopathy confirmed with new dx of Chronic   diastolic CHF confirmed POA with acute component ruled out.   -- After study, Ischemic cardiomyopathy confirmed with new dx of  Acute   diastolic CHF confirmed POA  -- Other - I will add my own diagnosis  -- Disagree - Not applicable / Not valid  -- Disagree - Clinically unable to determine / Unknown  -- Refer to Clinical Documentation Reviewer    PROVIDER RESPONSE TEXT:    After study, Ischemic cardiomyopathy with new dx - Chronic diastolic CHF   confirmed POA, with acute component ruled out.  .    Query created by: Misty Garay on 5/27/2021 1:49 PM      Electronically signed by:  Margaret Watson MD 5/27/2021 4:52 PM

## 2021-06-07 NOTE — PROGRESS NOTES
[address] [facility]   Cardiovascular Evaluation    PATIENT: Arielle Machado  DATE: 2021  MRN: <[id number]>  CSN: [id number]  : 1980      Primary Care Doctor: KIKA Rosas CNP  Reason for evaluation:   Follow-up for CAD    Subjective:     Dung Napier. Cami Puente is a 57-year-old female with a history of CAD, ischemic cardiomyopathy, essential hypertension, hyperlipidemia, type II DM, asthma, morbid obesity, and tobacco use who presents for follow-up. The patient was admitted for an NSTEMI from -21 and underwent invasive coronary angiography demonstrating severe small branch vessel coronary artery disease involving the distal right posterior descending artery, mid-second right posterolateral branch, and distal third right posterolateral branch in vessel segments that measure <1.5 mm in diameter and are not amenable to percutaneous coronary intervention. The OM1 was also a relatively small caliber vessel with a high origin and had an ostial 60-70% stenosis that was not a good target for percutaneous coronary intervention either. 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 TTE obtained during her admission showed an LVEF of 50-55% with mild inferior hypokinesis, mild concentric LVH, grade 1 diastolic dysfunction, normal RV size and systolic function, and mild mitral regurgitation. Since her discharge, the patient reports that she is overall feeling much better. She has had a few episodes of substernal chest pain and shortness of breath that have occurred at rest, but nothing as intense as the symptoms she experienced at the time of her recent admission. She has been trying to be more active and has been walking her dog regularly. She has cut back to smoking 5 cigarettes per day and is working on eating a healthier diet.   She is scheduled to start diabetic classes soon. She denies palpitations, lightheadedness, dizziness, lower extremity edema, orthopnea, and paroxysmal nocturnal dyspnea. She states that she is having to pay a lot out-of-pocket for her Brilinta and would like to switch to a cheaper alternative. Patient Active Problem List   Diagnosis    NSTEMI (non-ST elevated myocardial infarction) ([facility]    DM (diabetes mellitus), type 2 ([facility]    Chest pain    Tobacco abuse    HTN (hypertension)       Past Medical History:   has a past medical history of Asthma, COPD (chronic obstructive pulmonary disease) ([facility] Diabetes mellitus ([facility] Hyperlipidemia, Hypertension, Obesity, and Scoliosis. Surgical History:   has no past surgical history on file. Social History:   reports that she has been smoking cigarettes. She has a 29.00 pack-year smoking history. She has never used smokeless tobacco. She reports current alcohol use. She reports current drug use. Drug: Marijuana. Family History:  Family History   Problem Relation Age of Onset    Heart Disease Mother     Stroke Sister      Home Medications:  Reviewed and are listed in nursing record.  and/or listed below  Current Outpatient Medications   Medication Sig Dispense Refill    aspirin 81 MG chewable tablet Take 1 tablet by mouth daily 30 tablet 0    isosorbide mononitrate (IMDUR) 30 MG extended release tablet Take 1 tablet by mouth daily 30 tablet 0    atorvastatin (LIPITOR) 80 MG tablet Take 1 tablet by mouth nightly 30 tablet 0    lisinopril (PRINIVIL;ZESTRIL) 2.5 MG tablet Take 1 tablet by mouth daily 30 tablet 0    metoprolol succinate (TOPROL XL) 25 MG extended release tablet Take 1 tablet by mouth daily 30 tablet 0    ticagrelor (BRILINTA) 90 MG TABS tablet Take 1 tablet by mouth 2 times daily 60 tablet 0    naproxen (NAPROSYN) 500 MG tablet Take 500 mg by mouth 2 times daily (with meals)      metFORMIN (GLUCOPHAGE) 1000 MG tablet Take 1 tablet by mouth 2 times daily (with meals) General: Morbidly obese adult female in no acute distress. Pleasant and interactive on exam.  HEENT: Normocephalic, atraumatic, non-icteric, hearing intact, nares normal, mucous membranes moist.  Neck: No JVD. Heart: Regular rate and rhythm. Normal S1 and S2. No murmurs, gallops or rubs. Lungs: Normal respiratory effort. Clear to auscultation bilaterally. No wheezes, rales, or rhonchi. Abdomen: Soft, non-tender. Normoactive bowel sounds. No masses or organomegaly. Skin: No rashes, wounds, or lesions. Pulses: 2+ and symmetric. Extremities: Right radial artery access site is soft, non-tender, and without evidence of hematoma. Right radial artery pulse is 2+. No clubbing, cyanosis, or edema. Psych: Normal mood and affect. Neuro: Alert and oriented to person, place, and time. No focal deficits noted.     LABS   CBC:      Lab Results   Component Value Date    WBC 10.6 05/25/2021    RBC 4.46 05/25/2021    HGB 13.8 05/25/2021    HCT 39.9 05/25/2021    MCV 89.6 05/25/2021    RDW 13.8 05/25/2021     05/25/2021     CMP:  Lab Results   Component Value Date     05/24/2021    K 3.5 05/24/2021     05/24/2021    CO2 28 05/24/2021    BUN 11 05/24/2021    CREATININE 0.7 05/24/2021    GFRAA >60 05/24/2021    AGRATIO 1.4 05/23/2021    LABGLOM >60 05/24/2021    GLUCOSE 193 05/24/2021    PROT 6.7 05/23/2021    CALCIUM 9.6 05/24/2021    BILITOT 0.3 05/23/2021    ALKPHOS 109 05/23/2021    AST 16 05/23/2021    ALT 13 05/23/2021     PT/INR:   No results found for: PTINR  Liver:  No components found for: CHLPL  Lab Results   Component Value Date    ALT 13 05/23/2021    AST 16 05/23/2021    ALKPHOS 109 05/23/2021    BILITOT 0.3 05/23/2021     Lab Results   Component Value Date    LABA1C 11.3 05/24/2021     Lipids:         Lab Results   Component Value Date    TRIG 292 (H) 05/24/2021            Lab Results   Component Value Date    HDL 25 (L) 05/24/2021            Lab Results   Component Value Date    LDLCALC 64 05/24/2021            Lab Results   Component Value Date    LABVLDL 58 05/24/2021         CARDIAC DATA     ECHO:  TTE 5/25/21:  Summary   Left ventricular systolic function is normal with ejection fraction   estimated at 50-55%. There is mild hypokinesis of the inferior wall. There is mild concentric left ventricular hypertrophy. Grade I diastolic dysfunction with normal left ventricular filling pressure. Mild mitral regurgitation. STRESS TEST: N/A    CARDIAC CATH:   [facility] 5/24/21:  Findings:  1. Hemodynamics:              A. Opening arterial pressure: 131/85 (115) mmHg              B. LVEDP: 16 mmHg     2. Coronary anatomy:   A. Left main artery: The left main artery bifurcates into the left anterior descending artery and left circumflex artery. The left main artery has minor luminal irregularities. B. Left anterior descending artery: Transapical vessel which gives rise to 2 diagonal arteries. The LAD has 20% ostial, 40% proxima, and 40% mid-vessel stenoses. 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 The diagonal arteries have minor luminal irregularities. C. Left circumflex artery: Non-dominant vessel that gives rise to 3 obtuse marginal arteries. The LCx has minor luminal irregularities. The OM1 is a relatively small caliber vessel with a high origin and has a 60-70% ostial stenosis. The OM2 is a large vessel that courses to the apex and has minor luminal irregularities. The OM3 is a medium caliber vessel with minor luminal irregularities. D. Right coronary artery: Large, dominant vessel that gives rise to the posterior descending artery and 3 posterolateral branches. The RCA has miinor luminal irregularities up to the bifurcation of the RPDA. After the RDPA, the distal RCA tapers to a relative small caliber vessel and has a 50% stenosis.   The RPDA has a distal 90% stenosis in a segment of the vessel that measures <1.5 mm in diameter. The first RPLB is a small vessel with mild disease. The second RPLB is a small vessel with a 90% stenosis in the mid-segment. The third RPLB is a small-medium caliber vessel with a distal 95% stenosis in a segment of the vessel that measures <1.5 mm in diameter.     3. Left ventriculography:  A. LVEF 40% with mild diffuse hypokinesis and that is slightly more pronounced inferiorly. B. No significant mitral regurgitation.      Impression:  1. NSTEMI. 2. Severe small branch vessel coronary artery disease involving the distal right posterior descending artery, mid-second right posterolateral branch, and distal third right posterolateral branch in segments of the vessel that measure <1.5 mm in diameter and are not amenable to percutaneous coronary intervention. The OM1 is also a relatively small caliber vessel with a high origin and has an ostial 60-70% stenosis that is not a good target for percutaneous coronary intervention either. 3. 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 4. 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 5. Mildly elevated LVEDP. Assessment:     1. CAD - S/p NSTEMI in May. Underwent invasive coronary angiography at that time demonstrating severe small branch vessel coronary artery disease involving the distal right posterior descending artery, mid-second right posterolateral branch, and distal third right posterolateral branch in vessel segments that measure <1.5 mm in diameter and are not amenable to percutaneous coronary intervention. The OM1 was also a relatively small caliber vessel with a high origin and had an ostial 60-70% stenosis that was not a good target for percutaneous coronary intervention either.   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 TTE obtained during her admission showed an LVEF of 50-55% with mild inferior hypokinesis, mild concentric LVH, grade 1 diastolic dysfunction, normal RV size and systolic function, and mild mitral regurgitation. 2. Ischemic cardiomyopathy - TTE from 5/25/21 showed LVEF of 50-55% with mild inferior hypokinesis. Currently appears clinically euvolemic. 3. Essential hypertension - BP currently controlled. 4. Hyperlipidemia  5. Type II DM  6. Morbid obesity  7. Tobacco use      Plan:     1. Overall, patient appears compensated from a cardiovascular standpoint and reports stable angina. 2. Due to high out-of-pocket cost for Brilinta, will switch to Plavix 75 mg daily. No strong indication for loading with Plavix since she did not have any stents placed at the time of her NSTEMI. 3. Increase metoprolol succinate to 50 mg daily to further optimize anti-anginal therapy and myocardial oxygen supply/demand ratio. 4. Continue atorvastatin 80 mg daily, imdur 30 mg daily, and lisinopril 2.5 mg daily. 5. Reviewed risks of continued tobacco use and strongly encouraged smoking cessation. 6. Discussed importance of weight loss, heart healthy, low sodium diet, and regular physical exercise for at least 30 minutes 3-5 times per week. 7. Follow-up with me in 3 months. Scribe's attestation: This note was scribed in the presence of Albino Chang DO by Patrick Lopez RN. It is a pleasure to assist in the care of Sherren Brooking. Please call with any questions. The scribes documentation has been prepared under my direction and personally reviewed by me in its entirety. I confirm that the note above accurately reflects all work, treatment, procedures, and medical decision making performed by me.   I, Dr. Geri Orlando, personally performed the services described in this documentation as scribed by Patrick Lopez RN in my presence, and it is both accurate and complete to the best of our ability.          Karla Quintero, [facility] [facility]  [phone] [facility]  [phone] [address] [place] [address]

## 2021-06-09 ENCOUNTER — OFFICE VISIT (OUTPATIENT)
Dept: CARDIOLOGY CLINIC | Age: 41
End: 2021-06-09

## 2021-06-09 VITALS
WEIGHT: 260 LBS | BODY MASS INDEX: 39.4 KG/M2 | DIASTOLIC BLOOD PRESSURE: 70 MMHG | OXYGEN SATURATION: 98 % | SYSTOLIC BLOOD PRESSURE: 110 MMHG | HEIGHT: 68 IN | HEART RATE: 88 BPM

## 2021-06-09 DIAGNOSIS — E78.5 HYPERLIPIDEMIA, UNSPECIFIED HYPERLIPIDEMIA TYPE: ICD-10-CM

## 2021-06-09 DIAGNOSIS — I21.4 NSTEMI (NON-ST ELEVATED MYOCARDIAL INFARCTION) (HCC): ICD-10-CM

## 2021-06-09 DIAGNOSIS — I10 ESSENTIAL HYPERTENSION: ICD-10-CM

## 2021-06-09 DIAGNOSIS — E66.01 MORBID OBESITY (HCC): ICD-10-CM

## 2021-06-09 DIAGNOSIS — I25.118 CORONARY ARTERY DISEASE OF NATIVE ARTERY OF NATIVE HEART WITH STABLE ANGINA PECTORIS (HCC): Primary | ICD-10-CM

## 2021-06-09 DIAGNOSIS — Z72.0 TOBACCO USE: ICD-10-CM

## 2021-06-09 DIAGNOSIS — I25.5 ISCHEMIC CARDIOMYOPATHY: ICD-10-CM

## 2021-06-09 DIAGNOSIS — E11.69 TYPE 2 DIABETES MELLITUS WITH OTHER SPECIFIED COMPLICATION, UNSPECIFIED WHETHER LONG TERM INSULIN USE (HCC): ICD-10-CM

## 2021-06-09 PROCEDURE — 99214 OFFICE O/P EST MOD 30 MIN: CPT | Performed by: INTERNAL MEDICINE

## 2021-06-09 RX ORDER — METOPROLOL SUCCINATE 50 MG/1
50 TABLET, EXTENDED RELEASE ORAL DAILY
Qty: 90 TABLET | Refills: 3 | Status: SHIPPED | OUTPATIENT
Start: 2021-06-09

## 2021-06-09 RX ORDER — CLOPIDOGREL BISULFATE 75 MG/1
75 TABLET ORAL DAILY
Qty: 90 TABLET | Refills: 1 | Status: SHIPPED | OUTPATIENT
Start: 2021-06-09

## 2021-06-09 NOTE — PATIENT INSTRUCTIONS
Patient Plan:  1. We will change Brilinta to Plavix 75 mg daily due to cost. No need to do a loading dose  she she   2. Increase Metoprolol succinate to 50 mg daily foe better heart rate and blood pressure control. This may also help with your chest pain   3. Continue other medications including Imdur   4. Continue to work on smoking cessation   5.  Follow up with me in 3 months

## 2021-06-24 ASSESSMENT — ENCOUNTER SYMPTOMS
EYE PAIN: 0
PHOTOPHOBIA: 0
NAUSEA: 0
ABDOMINAL PAIN: 0
COUGH: 0
CONSTIPATION: 0
SORE THROAT: 0
VOMITING: 0
SHORTNESS OF BREATH: 1
BLOOD IN STOOL: 0
DIARRHEA: 0
RHINORRHEA: 0

## 2021-09-03 ASSESSMENT — ENCOUNTER SYMPTOMS
PHOTOPHOBIA: 0
COUGH: 0
NAUSEA: 0
SORE THROAT: 0
CONSTIPATION: 0
EYE PAIN: 0
ABDOMINAL PAIN: 0
DIARRHEA: 0
BLOOD IN STOOL: 0
RHINORRHEA: 0
VOMITING: 0
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
## 2021-09-08 ENCOUNTER — OFFICE VISIT (OUTPATIENT)
Dept: CARDIOLOGY CLINIC | Age: 41
End: 2021-09-08
Payer: MEDICAID

## 2021-09-08 VITALS
SYSTOLIC BLOOD PRESSURE: 92 MMHG | HEART RATE: 88 BPM | DIASTOLIC BLOOD PRESSURE: 60 MMHG | OXYGEN SATURATION: 98 % | WEIGHT: 237 LBS | BODY MASS INDEX: 35.92 KG/M2 | HEIGHT: 68 IN

## 2021-09-08 DIAGNOSIS — E66.01 MORBID OBESITY (HCC): ICD-10-CM

## 2021-09-08 DIAGNOSIS — I10 ESSENTIAL HYPERTENSION: ICD-10-CM

## 2021-09-08 DIAGNOSIS — I25.10 CORONARY ARTERY DISEASE INVOLVING NATIVE CORONARY ARTERY OF NATIVE HEART WITHOUT ANGINA PECTORIS: Primary | ICD-10-CM

## 2021-09-08 DIAGNOSIS — I25.5 ISCHEMIC CARDIOMYOPATHY: ICD-10-CM

## 2021-09-08 DIAGNOSIS — Z72.0 TOBACCO USE: ICD-10-CM

## 2021-09-08 DIAGNOSIS — E78.5 HYPERLIPIDEMIA, UNSPECIFIED HYPERLIPIDEMIA TYPE: ICD-10-CM

## 2021-09-08 DIAGNOSIS — I21.4 NSTEMI (NON-ST ELEVATED MYOCARDIAL INFARCTION) (HCC): ICD-10-CM

## 2021-09-08 PROCEDURE — 99214 OFFICE O/P EST MOD 30 MIN: CPT | Performed by: INTERNAL MEDICINE

## 2021-09-08 PROCEDURE — 4004F PT TOBACCO SCREEN RCVD TLK: CPT | Performed by: INTERNAL MEDICINE

## 2021-09-08 PROCEDURE — G8417 CALC BMI ABV UP PARAM F/U: HCPCS | Performed by: INTERNAL MEDICINE

## 2021-09-08 PROCEDURE — G8427 DOCREV CUR MEDS BY ELIG CLIN: HCPCS | Performed by: INTERNAL MEDICINE

## 2021-09-08 RX ORDER — NITROGLYCERIN 0.4 MG/1
0.4 TABLET SUBLINGUAL EVERY 5 MIN PRN
Qty: 25 TABLET | Refills: 3 | Status: SHIPPED | OUTPATIENT
Start: 2021-09-08 | End: 2022-04-13

## 2021-09-08 ASSESSMENT — ENCOUNTER SYMPTOMS: SHORTNESS OF BREATH: 0
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
## 2021-09-08 NOTE — PATIENT INSTRUCTIONS
1. Continue current medication therapy  2. Continue to work on smoking cessation  3. Stay active with regular exercise  4. Heart healthy diet: low sodium, lots of vegetables  5. Take nitroglycerin tablets for chest pain: sit down, place one under the tongue, wait 5 minutes. If pain persists, take another one, wait 5 minutes. If pain persists take 3rd tablet but also call 911.

## 2021-11-09 ENCOUNTER — HOSPITAL ENCOUNTER (EMERGENCY)
Age: 41
Discharge: HOME | End: 2021-11-09
Attending: STUDENT IN AN ORGANIZED HEALTH CARE EDUCATION/TRAINING PROGRAM
Payer: MEDICAID

## 2021-11-09 ENCOUNTER — APPOINTMENT (OUTPATIENT)
Dept: CT IMAGING | Age: 41
End: 2021-11-09
Payer: MEDICAID

## 2021-11-09 VITALS
WEIGHT: 235 LBS | HEART RATE: 69 BPM | HEIGHT: 67 IN | OXYGEN SATURATION: 100 % | DIASTOLIC BLOOD PRESSURE: 70 MMHG | RESPIRATION RATE: 16 BRPM | SYSTOLIC BLOOD PRESSURE: 115 MMHG | TEMPERATURE: 98.5 F | BODY MASS INDEX: 36.88 KG/M2

## 2021-11-09 DIAGNOSIS — K92.1 HEMATOCHEZIA: ICD-10-CM

## 2021-11-09 DIAGNOSIS — R10.9 ABDOMINAL PAIN, UNSPECIFIED ABDOMINAL LOCATION: Primary | ICD-10-CM

## 2021-11-09 LAB
A/G RATIO: 1.5 (ref 1.1–2.2)
ALBUMIN SERPL-MCNC: 3.8 G/DL (ref 3.4–5)
ALP BLD-CCNC: 87 U/L (ref 40–129)
ALT SERPL-CCNC: 7 U/L (ref 10–40)
ANION GAP SERPL CALCULATED.3IONS-SCNC: 10 MMOL/L (ref 3–16)
AST SERPL-CCNC: 7 U/L (ref 15–37)
BASOPHILS ABSOLUTE: 0.1 K/UL (ref 0–0.2)
BASOPHILS RELATIVE PERCENT: 0.7 %
BILIRUB SERPL-MCNC: 0.4 MG/DL (ref 0–1)
BUN BLDV-MCNC: 8 MG/DL (ref 7–20)
CALCIUM SERPL-MCNC: 9.4 MG/DL (ref 8.3–10.6)
CHLORIDE BLD-SCNC: 103 MMOL/L (ref 99–110)
CO2: 27 MMOL/L (ref 21–32)
CREAT SERPL-MCNC: <0.5 MG/DL (ref 0.6–1.1)
EKG ATRIAL RATE: 72 BPM
EKG DIAGNOSIS: NORMAL
EKG P AXIS: 28 DEGREES
EKG P-R INTERVAL: 210 MS
EKG Q-T INTERVAL: 398 MS
EKG QRS DURATION: 90 MS
EKG QTC CALCULATION (BAZETT): 435 MS
EKG R AXIS: 11 DEGREES
EKG T AXIS: 18 DEGREES
EKG VENTRICULAR RATE: 72 BPM
EOSINOPHILS ABSOLUTE: 0.1 K/UL (ref 0–0.6)
EOSINOPHILS RELATIVE PERCENT: 1.2 %
GFR AFRICAN AMERICAN: >60
GFR NON-AFRICAN AMERICAN: >60
GLUCOSE BLD-MCNC: 115 MG/DL (ref 70–99)
HCG QUALITATIVE: NEGATIVE
HCT VFR BLD CALC: 46.3 % (ref 36–48)
HEMOGLOBIN: 15.1 G/DL (ref 12–16)
LIPASE: 21 U/L (ref 13–60)
LYMPHOCYTES ABSOLUTE: 3 K/UL (ref 1–5.1)
LYMPHOCYTES RELATIVE PERCENT: 29.2 %
MCH RBC QN AUTO: 29.7 PG (ref 26–34)
MCHC RBC AUTO-ENTMCNC: 32.6 G/DL (ref 31–36)
MCV RBC AUTO: 91.3 FL (ref 80–100)
MONOCYTES ABSOLUTE: 0.4 K/UL (ref 0–1.3)
MONOCYTES RELATIVE PERCENT: 3.9 %
NEUTROPHILS ABSOLUTE: 6.7 K/UL (ref 1.7–7.7)
NEUTROPHILS RELATIVE PERCENT: 65 %
OCCULT BLOOD DIAGNOSTIC: NORMAL
PDW BLD-RTO: 14.9 % (ref 12.4–15.4)
PLATELET # BLD: 192 K/UL (ref 135–450)
PMV BLD AUTO: 9.2 FL (ref 5–10.5)
POTASSIUM REFLEX MAGNESIUM: 3.9 MMOL/L (ref 3.5–5.1)
RBC # BLD: 5.08 M/UL (ref 4–5.2)
SODIUM BLD-SCNC: 140 MMOL/L (ref 136–145)
TOTAL PROTEIN: 6.3 G/DL (ref 6.4–8.2)
TROPONIN: <0.01 NG/ML
WBC # BLD: 10.2 K/UL (ref 4–11)

## 2021-11-09 PROCEDURE — 6360000004 HC RX CONTRAST MEDICATION: Performed by: STUDENT IN AN ORGANIZED HEALTH CARE EDUCATION/TRAINING PROGRAM

## 2021-11-09 PROCEDURE — 6360000002 HC RX W HCPCS: Performed by: STUDENT IN AN ORGANIZED HEALTH CARE EDUCATION/TRAINING PROGRAM

## 2021-11-09 PROCEDURE — 99284 EMERGENCY DEPT VISIT MOD MDM: CPT

## 2021-11-09 PROCEDURE — 84703 CHORIONIC GONADOTROPIN ASSAY: CPT

## 2021-11-09 PROCEDURE — 83690 ASSAY OF LIPASE: CPT

## 2021-11-09 PROCEDURE — 93010 ELECTROCARDIOGRAM REPORT: CPT | Performed by: INTERNAL MEDICINE

## 2021-11-09 PROCEDURE — 84484 ASSAY OF TROPONIN QUANT: CPT

## 2021-11-09 PROCEDURE — 82270 OCCULT BLOOD FECES: CPT

## 2021-11-09 PROCEDURE — 93005 ELECTROCARDIOGRAM TRACING: CPT | Performed by: STUDENT IN AN ORGANIZED HEALTH CARE EDUCATION/TRAINING PROGRAM

## 2021-11-09 PROCEDURE — 96374 THER/PROPH/DIAG INJ IV PUSH: CPT

## 2021-11-09 PROCEDURE — 85025 COMPLETE CBC W/AUTO DIFF WBC: CPT

## 2021-11-09 PROCEDURE — 74176 CT ABD & PELVIS W/O CONTRAST: CPT

## 2021-11-09 PROCEDURE — 80053 COMPREHEN METABOLIC PANEL: CPT

## 2021-11-09 PROCEDURE — 96375 TX/PRO/DX INJ NEW DRUG ADDON: CPT

## 2021-11-09 RX ORDER — CIPROFLOXACIN AND DEXAMETHASONE 3; 1 MG/ML; MG/ML
4 SUSPENSION/ DROPS AURICULAR (OTIC) 2 TIMES DAILY
Qty: 1 EACH | Refills: 0 | Status: SHIPPED | OUTPATIENT
Start: 2021-11-09 | End: 2021-11-16

## 2021-11-09 RX ORDER — MORPHINE SULFATE 4 MG/ML
4 INJECTION, SOLUTION INTRAMUSCULAR; INTRAVENOUS EVERY 30 MIN PRN
Status: DISCONTINUED | OUTPATIENT
Start: 2021-11-09 | End: 2021-11-09 | Stop reason: HOSPADM

## 2021-11-09 RX ORDER — ONDANSETRON 2 MG/ML
4 INJECTION INTRAMUSCULAR; INTRAVENOUS EVERY 30 MIN PRN
Status: DISCONTINUED | OUTPATIENT
Start: 2021-11-09 | End: 2021-11-09 | Stop reason: HOSPADM

## 2021-11-09 RX ORDER — DICYCLOMINE HYDROCHLORIDE 10 MG/1
10 CAPSULE ORAL EVERY 6 HOURS PRN
Qty: 20 CAPSULE | Refills: 0 | Status: SHIPPED | OUTPATIENT
Start: 2021-11-09 | End: 2021-11-14

## 2021-11-09 RX ADMIN — MORPHINE SULFATE 4 MG: 4 INJECTION INTRAVENOUS at 08:08

## 2021-11-09 RX ADMIN — IOPAMIDOL 75 ML: 755 INJECTION, SOLUTION INTRAVENOUS at 09:56

## 2021-11-09 RX ADMIN — ONDANSETRON HYDROCHLORIDE 4 MG: 2 INJECTION, SOLUTION INTRAMUSCULAR; INTRAVENOUS at 08:08

## 2021-11-09 ASSESSMENT — PAIN DESCRIPTION - LOCATION: LOCATION: ABDOMEN

## 2021-11-09 ASSESSMENT — PAIN DESCRIPTION - PAIN TYPE: TYPE: ACUTE PAIN

## 2021-11-09 ASSESSMENT — PAIN SCALES - GENERAL
PAINLEVEL_OUTOF10: 0
PAINLEVEL_OUTOF10: 7
PAINLEVEL_OUTOF10: 7

## 2021-11-09 NOTE — Clinical Note
Royce Aquino was seen and treated in our emergency department on 11/9/2021. She may return to work on 11/10/2021. If you have any questions or concerns, please don't hesitate to call.       Sarthak Luque, DO

## 2021-11-09 NOTE — ED PROVIDER NOTES
[address] ED      CHIEF COMPLAINT  Abdominal Pain (Pt states abdominal pain that worsened yesterday.) and Rectal Bleeding (Pt states bright red blood in stools for three days. Pt states discoloration on right lower abdomen into groin and into her leg. Pt concerned for infection.)       HISTORY OF PRESENT ILLNESS  Warren Lora is a 35 y.o. female  who presents to the ED complaining of right red blood per rectum for the last 4 days that she describes as filling the toilet bowl, as well as right side abdominal pain that started yesterday, and bleeding from her left ear that she noticed this morning. Patient states she has had hemorrhoids in the past, but notes that this is much more severe bleeding that she has had before. She does endorse feeling lightheaded and weak today. She states the pain in her right abdomen has been worsening since yesterday. She admits to chills, but denies fevers. She denies chest pain, shortness of breath, vomiting. No other complaints, modifying factors or associated symptoms. I have reviewed the following from the nursing documentation. Past Medical History:   Diagnosis Date    Asthma     [facility]ronic [facility]) [facility]     Diabetes mellitus ([facility]     Hyperlipidemia     Hypertension     Obesity     Scoliosis      History reviewed. No pertinent surgical history.   Family History   Problem Relation Age of Onset    Heart Disease Mother     Stroke Sister      Social History     Socioeconomic History    Marital status:      Spouse name: Not on file    Number of children: Not on file    Years of education: Not on file    Highest education level: Not on file   Occupational History    Not on file   Tobacco Use    Smoking status: Current Every Day Smoker     Packs/day: 1.00     Years: 29.00     Pack years: 29.00     Types: Cigarettes    Smokeless tobacco: Never Used   Vaping Use    Vaping Use: Never used   Substance and Sexual Activity    Alcohol use: Yes     Comment: occasionally    Drug use: Yes     Types: Marijuana Danielle Jed)    Sexual activity: Not on file   Other Topics Concern    Not on file   Social History Narrative    Not on file     Social Determinants of Health     Financial Resource Strain:     Difficulty of Paying Living Expenses: Not on file   Food Insecurity:     Worried About Running Out of Food in the Last Year: Not on file    Luis of Food in the Last Year: Not on file   Transportation Needs:     Lack of Transportation (Medical): Not on file    Lack of Transportation (Non-Medical): Not on file   Physical Activity:     Days of Exercise per Week: Not on file    Minutes of Exercise per Session: Not on file   Stress:     Feeling of Stress : Not on file   Social Connections:     Frequency of Communication with Friends and Family: Not on file    Frequency of Social Gatherings with Friends and Family: Not on file    Attends Church Services: Not on file    Active Member of [address] [place] [address] or Organizations: Not on file    Attends Club or Organization Meetings: Not on file    Marital Status: Not on file   Intimate Partner Violence:     Fear of Current or Ex-Partner: Not on file    Emotionally Abused: Not on file    Physically Abused: Not on file    Sexually Abused: Not on file   Housing Stability:     Unable to Pay for Housing in the Last Year: Not on file    Number of [place] in the Last Year: Not on file    Unstable Housing in the Last Year: Not on file     No current facility-administered medications for this encounter. Current Outpatient Medications   Medication Sig Dispense Refill    nitroGLYCERIN (NITROSTAT) 0.4 MG SL tablet Place 1 tablet under the tongue every 5 minutes as needed for Chest pain No more than 3 tabs in 1 15 minute period.  25 tablet 3    clopidogrel (PLAVIX) 75 MG tablet Take 1 tablet by mouth daily 90 tablet 1    metoprolol succinate (TOPROL XL) 50 MG extended release tablet Take 1 tablet by mouth daily 90 tablet 3    aspirin 81 MG chewable tablet Take 1 tablet by mouth daily 30 tablet 0    isosorbide mononitrate (IMDUR) 30 MG extended release tablet Take 1 tablet by mouth daily 30 tablet 0    atorvastatin (LIPITOR) 80 MG tablet Take 1 tablet by mouth nightly 30 tablet 0    lisinopril (PRINIVIL;ZESTRIL) 2.5 MG tablet Take 1 tablet by mouth daily 30 tablet 0    naproxen (NAPROSYN) 500 MG tablet Take 500 mg by mouth 2 times daily (with meals)      metFORMIN (GLUCOPHAGE) 1000 MG tablet Take 1 tablet by mouth 2 times daily (with meals) 60 tablet 5    albuterol sulfate  (90 Base) MCG/ACT inhaler Inhale 2 puffs into the lungs every 6 hours as needed for Wheezing      beclomethasone (QVAR) 80 MCG/ACT inhaler Inhale 1 puff into the lungs 2 times daily       No Known Allergies    REVIEW OF SYSTEMS  10 systems reviewed, pertinent positives per HPI otherwise noted to be negative. PHYSICAL EXAM  BP (!) 150/60   Pulse 75   Temp 97.8 °F (36.6 °C) (Oral)   Resp 20   Ht 5' 7\" (1.702 m)   Wt 235 lb (106.6 kg)   LMP 11/01/2021   SpO2 100%   BMI 36.81 kg/m²    GENERAL APPEARANCE: Awake and alert. Cooperative. no distress. HENT: Normocephalic. Atraumatic. Mucous membranes are moist  NECK: Supple. EYES: PERRL. EOM's grossly intact. HEART/CHEST: RRR. No murmurs. LUNGS: Respirations unlabored. CTAB. Good air exchange. Speaking comfortably in full sentences. ABDOMEN: Right upper and right mid abdominal tenderness to palpation. Soft. Non-distended. No masses. No organomegaly. No guarding or rebound. Rectal: Small external hemorrhoid noted at the 12 o'clock position, no signs of active bleeding, no thrombosed hemorrhoids. No anal fissures. YANN does not reveal any internal hemorrhoids, brown stool noted. MUSCULOSKELETAL: No extremity edema. Compartments soft. No deformity. No tenderness in the extremities. All extremities neurovascularly intact.   SKIN: Warm and dry. No acute rashes. NEUROLOGICAL: Alert and oriented. CN's 2-12 intact. No gross facial drooping. Strength 5/5, sensation intact. PSYCHIATRIC: Normal mood and affect. LABS  I have reviewed all labs for this visit. No results found for this visit on 11/09/21. ECG  The Ekg interpreted by me shows  Sinus rhythm with sinus arrhythmia and a rate of 72 bpm.  Normal axis. No acute injury pattern. First-degree AV block with a AL of 210. QRS 90, QTc 435. No significant change from prior EKG dated 5/24/2021. RADIOLOGY  CT ABDOMEN PELVIS WO CONTRAST Additional Contrast? None   Final Result   1. No acute abdominopelvic process demonstrated. No evidence of colitis   2. Left nephrolithiasis   3. Colonic diverticulosis               ED COURSE/MDM  Patient seen and evaluated. Old records reviewed. Labs and imaging reviewed and results discussed with patient. Presenting with multiple complaints. Due to abdominal pain and hematochezia, lab work and CT of the abdomen pelvis obtained. CT shows no acute process, though diverticulosis is noted. I suspect that bleeding is from either internal hemorrhoid or diverticular bleed. Her left ear pain and bleeding is due to a scratch in her ear canal that has a small amount of dried blood on it. Due to this she will be discharged home with Ciprodex eardrops, dicyclomine and Tylenol for her abdominal pain. She is to follow-up with GI as well as her PCP. Return for severe worsening pain, fevers, nausea vomiting, or other concerning symptoms. During the patient's ED course, the patient was given:  Medications - No data to display     CLINICAL IMPRESSION  1. Abdominal pain, unspecified abdominal location    2. Hematochezia        Blood pressure (!) 150/60, pulse 75, temperature 97.8 °F (36.6 °C), temperature source Oral, resp.  rate 20, height 5' 7\" (1.702 m), weight 235 lb (106.6 kg), last menstrual period 11/01/2021, SpO2 100 %, not currently breastfeeding. DISPOSITION  Jez Men was discharged to home in good condition. Patient was given scripts for the following medications. I counseled patient how to take these medications. New Prescriptions    No medications on file       Follow-up with:  No follow-up provider specified. DISCLAIMER: This chart was created using Dragon dictation software. Efforts were made by me to ensure accuracy, however some errors may be present due to limitations of this technology and occasionally words are not transcribed correctly.        Chino Patel,   11/09/21 1630

## 2022-04-12 DIAGNOSIS — I21.4 NSTEMI (NON-ST ELEVATED MYOCARDIAL INFARCTION) (HCC): Primary | ICD-10-CM

## 2022-04-12 DIAGNOSIS — R07.9 CHEST PAIN, UNSPECIFIED TYPE: ICD-10-CM

## 2022-04-13 RX ORDER — NITROGLYCERIN 0.4 MG/1
TABLET SUBLINGUAL
Qty: 25 TABLET | Refills: 3 | Status: SHIPPED | OUTPATIENT
Start: 2022-04-13

## 2024-10-28 ENCOUNTER — OFFICE VISIT (OUTPATIENT)
Dept: CARDIOLOGY CLINIC | Age: 44
End: 2024-10-28

## 2024-10-28 VITALS
SYSTOLIC BLOOD PRESSURE: 122 MMHG | WEIGHT: 225.6 LBS | OXYGEN SATURATION: 97 % | HEART RATE: 91 BPM | HEIGHT: 67 IN | BODY MASS INDEX: 35.41 KG/M2 | DIASTOLIC BLOOD PRESSURE: 76 MMHG

## 2024-10-28 DIAGNOSIS — I25.118 ATHEROSCLEROSIS OF NATIVE CORONARY ARTERY OF NATIVE HEART WITH OTHER FORM OF ANGINA PECTORIS (HCC): Primary | ICD-10-CM

## 2024-10-28 DIAGNOSIS — E66.01 CLASS 2 SEVERE OBESITY DUE TO EXCESS CALORIES WITH SERIOUS COMORBIDITY AND BODY MASS INDEX (BMI) OF 35.0 TO 35.9 IN ADULT: ICD-10-CM

## 2024-10-28 DIAGNOSIS — E78.2 MIXED HYPERLIPIDEMIA: ICD-10-CM

## 2024-10-28 DIAGNOSIS — R07.9 CHEST PAIN, UNSPECIFIED TYPE: ICD-10-CM

## 2024-10-28 DIAGNOSIS — Z72.0 TOBACCO USE: ICD-10-CM

## 2024-10-28 DIAGNOSIS — I25.118 CORONARY ARTERY DISEASE INVOLVING NATIVE CORONARY ARTERY OF NATIVE HEART WITH OTHER FORM OF ANGINA PECTORIS (HCC): ICD-10-CM

## 2024-10-28 DIAGNOSIS — R55 SYNCOPE, UNSPECIFIED SYNCOPE TYPE: ICD-10-CM

## 2024-10-28 DIAGNOSIS — R42 LIGHTHEADEDNESS: ICD-10-CM

## 2024-10-28 DIAGNOSIS — I25.5 ISCHEMIC CARDIOMYOPATHY: ICD-10-CM

## 2024-10-28 DIAGNOSIS — E66.812 CLASS 2 SEVERE OBESITY DUE TO EXCESS CALORIES WITH SERIOUS COMORBIDITY AND BODY MASS INDEX (BMI) OF 35.0 TO 35.9 IN ADULT: ICD-10-CM

## 2024-10-28 PROBLEM — I25.10 ATHEROSCLEROTIC HEART DISEASE: Status: ACTIVE | Noted: 2024-10-28

## 2024-10-28 RX ORDER — LISINOPRIL 2.5 MG/1
2.5 TABLET ORAL DAILY
Qty: 90 TABLET | Refills: 3 | Status: SHIPPED | OUTPATIENT
Start: 2024-10-28

## 2024-10-28 RX ORDER — ATORVASTATIN CALCIUM 80 MG/1
80 TABLET, FILM COATED ORAL NIGHTLY
Qty: 90 TABLET | Refills: 3 | Status: SHIPPED | OUTPATIENT
Start: 2024-10-28

## 2024-10-28 RX ORDER — ISOSORBIDE MONONITRATE 30 MG/1
30 TABLET, EXTENDED RELEASE ORAL DAILY
Qty: 90 TABLET | Refills: 3 | Status: SHIPPED | OUTPATIENT
Start: 2024-10-28

## 2024-10-28 RX ORDER — METOPROLOL SUCCINATE 50 MG/1
50 TABLET, EXTENDED RELEASE ORAL DAILY
Qty: 90 TABLET | Refills: 3 | Status: SHIPPED | OUTPATIENT
Start: 2024-10-28

## 2024-10-28 RX ORDER — CLOPIDOGREL BISULFATE 75 MG/1
75 TABLET ORAL DAILY
Qty: 90 TABLET | Refills: 3 | Status: CANCELLED | OUTPATIENT
Start: 2024-10-28

## 2024-10-28 NOTE — PATIENT INSTRUCTIONS
1. Resume Aspirin 81 mg daily , Atorvastatin 80 mg once nightly, Imdur 30 mg once daily, Lisinopril 2.5 mg once daily, Metoprolol 50 mg once daily.   2. Okay to remain off of Plavix  3. Discussed importance of smoking cessation.   4. Order TTE for complete assessment of cardiac structure and function   5. Will obtain GXT nuclear SPECT stress test for further cardiac risk stratification. OK to convert to Lexiscan if does not achieve target heart rate with exercise.   6. Cardiac event monitor to assess for abnormal heart rhythms as the cause of your syncope.  7. Always rise slowly from lying and sitting positions and sit or lie down immediately whenever you begin to feel lightheaded. Make sure you are staying well hydrated   8. Follow up with me in 3 months

## 2024-11-01 ENCOUNTER — ANCILLARY PROCEDURE (OUTPATIENT)
Dept: CARDIOLOGY CLINIC | Age: 44
End: 2024-11-01
Payer: COMMERCIAL

## 2024-11-01 ENCOUNTER — TELEPHONE (OUTPATIENT)
Dept: CARDIOLOGY CLINIC | Age: 44
End: 2024-11-01

## 2024-11-01 DIAGNOSIS — R55 SYNCOPE, UNSPECIFIED SYNCOPE TYPE: ICD-10-CM

## 2024-11-01 PROCEDURE — 93242 EXT ECG>48HR<7D RECORDING: CPT | Performed by: INTERNAL MEDICINE

## 2024-11-01 NOTE — TELEPHONE ENCOUNTER
Monitor placed by kylea  Monitor company   Length of monitor 14 days  Monitor ordered by [facility]  Phone ID [email or web address]  First Patch ID [id number]  Activation successful prior to pt leaving office? Yes

## 2024-11-20 PROCEDURE — 93244 EXT ECG>48HR<7D REV&INTERPJ: CPT | Performed by: INTERNAL MEDICINE
